# Patient Record
Sex: FEMALE | Race: BLACK OR AFRICAN AMERICAN | Employment: PART TIME | URBAN - METROPOLITAN AREA
[De-identification: names, ages, dates, MRNs, and addresses within clinical notes are randomized per-mention and may not be internally consistent; named-entity substitution may affect disease eponyms.]

---

## 2021-03-01 ENCOUNTER — OFFICE VISIT (OUTPATIENT)
Dept: PRIMARY CARE CLINIC | Age: 21
End: 2021-03-01
Payer: MEDICAID

## 2021-03-01 VITALS
WEIGHT: 206 LBS | BODY MASS INDEX: 37.91 KG/M2 | OXYGEN SATURATION: 99 % | RESPIRATION RATE: 16 BRPM | TEMPERATURE: 97.8 F | SYSTOLIC BLOOD PRESSURE: 98 MMHG | DIASTOLIC BLOOD PRESSURE: 58 MMHG | HEART RATE: 100 BPM | HEIGHT: 62 IN

## 2021-03-01 DIAGNOSIS — Z00.00 ANNUAL PHYSICAL EXAM: Primary | ICD-10-CM

## 2021-03-01 DIAGNOSIS — E55.9 VITAMIN D DEFICIENCY: ICD-10-CM

## 2021-03-01 DIAGNOSIS — F98.8 ATTENTION DEFICIT DISORDER, UNSPECIFIED HYPERACTIVITY PRESENCE: ICD-10-CM

## 2021-03-01 PROCEDURE — 99395 PREV VISIT EST AGE 18-39: CPT | Performed by: INTERNAL MEDICINE

## 2021-03-01 NOTE — PROGRESS NOTES
Chief Complaint   Patient presents with   [facility].    Physical     1. Have you been to the ER, urgent care clinic since your last visit? Hospitalized since your last visit? No    2. Have you seen or consulted any other health care providers outside of the [address] [place] [address] since your last visit? Include any pap smears or colon screening.  No  .3

## 2021-03-01 NOTE — PROGRESS NOTES
Sierra Bailey is a 24 y.o.  female and presents with     Chief Complaint   Patient presents with   Emilia Mendez Establish Care    Physical     Pt is here to establish care. Pt lives in an apartment . Pt has a . Pt is in an independent living program.  Pt sees someone for ADD and trazadone. Pt sees Karthikeyan Stisabel for her psych care. Patient's former  informed me that patient is here for a physical and to establish care. Patient is apparently working with weight management to help her lose weight. Patient denies any past medical history. Denies any medical history in the family. Obese    Past Medical History:   Diagnosis Date    Psychiatric disorder      History reviewed. No pertinent surgical history. Current Outpatient Medications   Medication Sig    dextroamphetamine-amphetamine (ADDERALL) 10 mg tablet Take 10 mg by mouth daily. Indications: ATTENTION-DEFICIT HYPERACTIVITY DISORDER    traZODone (DESYREL) 50 mg tablet Take 50 mg by mouth nightly.  fluticasone (FLOVENT DISKUS) 50 mcg/actuation inhaler Take 2 Puffs by inhalation daily.  clindamycin (CLINDAGEL) 1 % topical gel two (2) times a day. use thin film on affected area   Indications: ACNE VULGARIS    cetirizine (ZYRTEC) 10 mg tablet Take  by mouth daily.  benzoyl peroxide 5 % external liquid Apply  to affected area nightly. Indications: ACNE VULGARIS    ibuprofen (MOTRIN) 400 mg tablet Take 1 Tab by mouth every eight (8) hours as needed for Pain. No current facility-administered medications for this visit. Health Maintenance   Topic Date Due    Hepatitis C Screening  Never done    DTaP/Tdap/Td series (1 - Tdap) Never done    HPV Age 9Y-34Y (1 - 2-dose series) Never done    Flu Vaccine  Completed    Hepatitis A Peds Age 1-18  Aged Out    Pneumococcal 0-64 years  Aged [employer] History   Administered Date(s) Administered    Influenza Vaccine 11/01/2020     No LMP recorded.  (Menstrual status: Other (see Comment)). Allergies and Intolerances:   No Known Allergies    Family History:   History reviewed. No pertinent family history. Social History:   She  reports that she has never smoked. She has never used smokeless tobacco.  She  reports no history of alcohol use. Review of Systems:   General: negative for - chills, fatigue, fever, weight change  Psych: negative for - anxiety, depression, irritability or mood swings  ENT: negative for - headaches, hearing change, nasal congestion, oral lesions, sneezing or sore throat  Heme/ Lymph: negative for - bleeding problems, bruising, pallor or swollen lymph nodes  Endo: negative for - hot flashes, polydipsia/polyuria or temperature intolerance  Resp: negative for - cough, shortness of breath or wheezing  CV: negative for - chest pain, edema or palpitations  GI: negative for - abdominal pain, change in bowel habits, constipation, diarrhea or nausea/vomiting  : negative for - dysuria, hematuria, incontinence, pelvic pain or vulvar/vaginal symptoms  MSK: negative for - joint pain, joint swelling or muscle pain  Neuro: negative for - confusion, headaches, seizures or weakness  Derm: negative for - dry skin, hair changes, rash or skin lesion changes          Physical:   Vitals:   Vitals:    03/01/21 1312   BP: (!) 98/58   Pulse: 100   Resp: 16   Temp: 97.8 °F (36.6 °C)   TempSrc: Temporal   SpO2: 99%   Weight: 206 lb (93.4 kg)   Height: 5' 2\" (1.575 m)           Exam:   HEENT- atraumatic,normocephalic, awake, oriented, well nourished, obese  Neck - supple,no enlarged lymph nodes, no JVD, no thyromegaly  Chest- CTA, no rhonchi, no crackles  Heart- rrr, no murmurs / gallop/rub  Abdomen- soft,BS+,NT, no hepatosplenomegaly  Ext - no c/c/edema   Neuro- no focal deficits. Power 5/5 all extremities  Skin - warm,dry, no obvious rashes.           Review of Data:   LABS:   No results found for: WBC, HGB, HCT, PLT, HGBEXT, HCTEXT, PLTEXT, HGBEXT, HCTEXT, PLTEXT  No results found for: NA, K, CL, CO2, GLU, BUN, CREA  No results found for: CHOL, CHOLX, CHLST, CHOLV, HDL, HDLP, LDL, LDLC, DLDLP, TGLX, TRIGL, TRIGP  No components found for: GPT        Impression / Plan:        ICD-10-CM ICD-9-CM    1. Annual physical exam  Z00.00 V70.0 CBC WITH AUTOMATED DIFF      METABOLIC PANEL, COMPREHENSIVE      LIPID PANEL      TSH 3RD GENERATION      VITAMIN D, 25 HYDROXY      VITAMIN D, 25 HYDROXY      TSH 3RD GENERATION      LIPID PANEL      METABOLIC PANEL, COMPREHENSIVE      CBC WITH AUTOMATED DIFF   2. Attention deficit disorder, unspecified hyperactivity presence  F98.8 314.00    3. Vitamin D deficiency  E55.9 268.9 VITAMIN D, 25 HYDROXY      VITAMIN D, 25 HYDROXY     ADD/insomnia-being followed by psychiatry. Patient is a poor historian and did not give much history. Explained to patient risk benefits of the medications. Advised patient to stop meds if having any side effects. Pt verbalized understanding of the instructions. I have discussed the diagnosis with the patient and the intended plan as seen in the above orders. The patient has received an after-visit summary and questions were answered concerning future plans. I have discussed medication side effects and warnings with the patient as well. I have reviewed the plan of care with the patient, accepted their input and they are in agreement with the treatment goals. Reviewed plan of care. Patient has provided input and agrees with goals.         Yanely Jaimes MD

## 2021-03-02 LAB
25(OH)D3+25(OH)D2 SERPL-MCNC: 21.3 NG/ML (ref 30–100)
ALBUMIN SERPL-MCNC: 3.9 G/DL (ref 3.9–5)
ALBUMIN/GLOB SERPL: 1.4 {RATIO} (ref 1.2–2.2)
ALP SERPL-CCNC: 77 IU/L (ref 39–117)
ALT SERPL-CCNC: 13 IU/L (ref 0–32)
AST SERPL-CCNC: 16 IU/L (ref 0–40)
BASOPHILS # BLD AUTO: 0 X10E3/UL (ref 0–0.2)
BASOPHILS NFR BLD AUTO: 0 %
BILIRUB SERPL-MCNC: <0.2 MG/DL (ref 0–1.2)
BUN SERPL-MCNC: 14 MG/DL (ref 6–20)
BUN/CREAT SERPL: 15 (ref 9–23)
CALCIUM SERPL-MCNC: 9.2 MG/DL (ref 8.7–10.2)
CHLORIDE SERPL-SCNC: 109 MMOL/L (ref 96–106)
CHOLEST SERPL-MCNC: 125 MG/DL (ref 100–169)
CO2 SERPL-SCNC: 21 MMOL/L (ref 20–29)
CREAT SERPL-MCNC: 0.92 MG/DL (ref 0.57–1)
EOSINOPHIL # BLD AUTO: 0.1 X10E3/UL (ref 0–0.4)
EOSINOPHIL NFR BLD AUTO: 2 %
ERYTHROCYTE [DISTWIDTH] IN BLOOD BY AUTOMATED COUNT: 13.4 % (ref 11.7–15.4)
GLOBULIN SER CALC-MCNC: 2.7 G/DL (ref 1.5–4.5)
GLUCOSE SERPL-MCNC: 84 MG/DL (ref 65–99)
HCT VFR BLD AUTO: 37.7 % (ref 34–46.6)
HDLC SERPL-MCNC: 41 MG/DL
HGB BLD-MCNC: 12.3 G/DL (ref 11.1–15.9)
IMM GRANULOCYTES # BLD AUTO: 0 X10E3/UL (ref 0–0.1)
IMM GRANULOCYTES NFR BLD AUTO: 0 %
LDLC SERPL CALC-MCNC: 73 MG/DL (ref 0–109)
LYMPHOCYTES # BLD AUTO: 2.6 X10E3/UL (ref 0.7–3.1)
LYMPHOCYTES NFR BLD AUTO: 40 %
MCH RBC QN AUTO: 25.9 PG (ref 26.6–33)
MCHC RBC AUTO-ENTMCNC: 32.6 G/DL (ref 31.5–35.7)
MCV RBC AUTO: 80 FL (ref 79–97)
MONOCYTES # BLD AUTO: 0.4 X10E3/UL (ref 0.1–0.9)
MONOCYTES NFR BLD AUTO: 7 %
NEUTROPHILS # BLD AUTO: 3.4 X10E3/UL (ref 1.4–7)
NEUTROPHILS NFR BLD AUTO: 51 %
PLATELET # BLD AUTO: 206 X10E3/UL (ref 150–450)
POTASSIUM SERPL-SCNC: 3.7 MMOL/L (ref 3.5–5.2)
PROT SERPL-MCNC: 6.6 G/DL (ref 6–8.5)
RBC # BLD AUTO: 4.74 X10E6/UL (ref 3.77–5.28)
SODIUM SERPL-SCNC: 142 MMOL/L (ref 134–144)
TRIGL SERPL-MCNC: 45 MG/DL (ref 0–89)
TSH SERPL DL<=0.005 MIU/L-ACNC: 2.52 UIU/ML (ref 0.45–4.5)
VLDLC SERPL CALC-MCNC: 11 MG/DL (ref 5–40)
WBC # BLD AUTO: 6.6 X10E3/UL (ref 3.4–10.8)

## 2021-03-03 ENCOUNTER — TELEPHONE (OUTPATIENT)
Dept: PRIMARY CARE CLINIC | Age: 21
End: 2021-03-03

## 2021-03-03 DIAGNOSIS — E55.9 VITAMIN D DEFICIENCY: Primary | ICD-10-CM

## 2021-03-03 RX ORDER — ACETAMINOPHEN 500 MG
2000 TABLET ORAL DAILY
Qty: 90 CAP | Refills: 1 | Status: SHIPPED | OUTPATIENT
Start: 2021-03-03 | End: 2021-08-20 | Stop reason: SDUPTHER

## 2021-03-03 NOTE — TELEPHONE ENCOUNTER
Called and spoke with , informed that Dr Mandi Bynum sent vitamin d to the pharmacy and the rest of the labs were normal.

## 2021-11-18 DIAGNOSIS — E55.9 VITAMIN D DEFICIENCY: ICD-10-CM

## 2021-11-20 RX ORDER — ACETAMINOPHEN 500 MG
2000 TABLET ORAL DAILY
Qty: 90 CAPSULE | Refills: 0 | Status: SHIPPED | OUTPATIENT
Start: 2021-11-20

## 2023-01-30 ENCOUNTER — HOSPITAL ENCOUNTER (EMERGENCY)
Age: 23
Discharge: HOME | End: 2023-01-30
Attending: FAMILY MEDICINE
Payer: MEDICAID

## 2023-01-30 ENCOUNTER — APPOINTMENT (OUTPATIENT)
Dept: GENERAL RADIOLOGY | Age: 23
End: 2023-01-30
Attending: FAMILY MEDICINE
Payer: MEDICAID

## 2023-01-30 VITALS
WEIGHT: 178 LBS | RESPIRATION RATE: 18 BRPM | HEART RATE: 71 BPM | TEMPERATURE: 98.7 F | DIASTOLIC BLOOD PRESSURE: 65 MMHG | BODY MASS INDEX: 32.76 KG/M2 | OXYGEN SATURATION: 100 % | SYSTOLIC BLOOD PRESSURE: 113 MMHG | HEIGHT: 62 IN

## 2023-01-30 DIAGNOSIS — S69.91XA INJURY OF FINGER OF RIGHT HAND, INITIAL ENCOUNTER: Primary | ICD-10-CM

## 2023-01-30 PROCEDURE — 99282 EMERGENCY DEPT VISIT SF MDM: CPT

## 2023-01-30 NOTE — ED TRIAGE NOTES
Pt had fake nails and when she took them off 3 days ago and is having pain in her middle nail on the right hand.

## 2023-01-30 NOTE — Clinical Note
[facility] EMERGENCY DEPARTMENT  [address] [facility] [address]  [phone]    Work/School Note    Date: 1/30/2023    To Whom It May concern:      Gabriel Hewitt was seen and treated today in the emergency room by the following provider(s):  Attending Provider: Romana Just, DO. Gabriel Hewitt is excused from work/school on 01/30/23. She is clear to return to work/school on 01/31/23.         Sincerely,          Nitin Welch DO

## 2023-01-30 NOTE — DISCHARGE INSTRUCTIONS
Thank you! Thank you for allowing me to care for you in the emergency department. It is my goal to provide you with excellent care. If you have not received excellent quality care, please ask to speak to the nurse manager. Please fill out the survey that will come to you by mail or email since we listen to your feedback! Below you will find a list of your tests from today's visit. Should you have any questions, please do not hesitate to call the emergency department. Labs  No results found for this or any previous visit (from the past 12 hour(s)). Radiologic Studies  XR HAND RT MIN 3 V    (Results Pending)     CT Results  (Last 48 hours)      None          CXR Results  (Last 48 hours)      None          ------------------------------------------------------------------------------------------------------------  The exam and treatment you received in the Emergency Department were for an urgent problem and are not intended as complete care. It is important that you follow-up with a doctor, nurse practitioner, or physician assistant to:  (1) confirm your diagnosis,  (2) re-evaluation of changes in your illness and treatment, and  (3) for ongoing care. Please take your discharge instructions with you when you go to your follow-up appointment. If you have any problem arranging a follow-up appointment, contact the Emergency Department. If your symptoms become worse or you do not improve as expected and you are unable to reach your health care provider, please return to the Emergency Department. We are available 24 hours a day. If a prescription has been provided, please have it filled as soon as possible to prevent a delay in treatment. If you have any questions or reservations about taking the medication due to side effects or interactions with other medications, please call your primary care provider or contact the ER.

## 2023-01-30 NOTE — ED PROVIDER NOTES
EMERGENCY DEPARTMENT HISTORY AND PHYSICAL EXAM      Date: 1/30/2023  Patient Name: Isabel Hassan    History of Presenting Illness     Chief Complaint   Patient presents with    Finger Pain       History Provided By:     HPI: Isabel Hassan, is a very pleasant 25 y.o. female presenting to the ED with a chief complaint of finger pain. Patient states she slammed her right middle finger in a door. She took off her fake nail and has been having pain of the right distal middle finger since. No numbness tingling or weakness in this digit. No other injuries. No alleviating or aggravating factors. The patient denies any other symptoms at this time. PCP: Richa Gallegos MD    No current facility-administered medications on file prior to encounter. Current Outpatient Medications on File Prior to Encounter   Medication Sig Dispense Refill    cholecalciferol (VITAMIN D3) (2,000 UNITS /50 MCG) cap capsule Take 1 Capsule by mouth daily. (Patient not taking: Reported on 1/30/2023) 90 Capsule 0    dextroamphetamine-amphetamine (ADDERALL) 10 mg tablet Take 10 mg by mouth daily. Indications: ATTENTION-DEFICIT HYPERACTIVITY DISORDER (Patient not taking: Reported on 1/30/2023)      traZODone (DESYREL) 50 mg tablet Take 50 mg by mouth nightly. (Patient not taking: Reported on 1/30/2023)      fluticasone (FLOVENT DISKUS) 50 mcg/actuation inhaler Take 2 Puffs by inhalation daily. (Patient not taking: Reported on 1/30/2023)      clindamycin (CLINDAGEL) 1 % topical gel two (2) times a day. use thin film on affected area   Indications: ACNE VULGARIS (Patient not taking: Reported on 1/30/2023)      cetirizine (ZYRTEC) 10 mg tablet Take  by mouth daily. (Patient not taking: Reported on 1/30/2023)      benzoyl peroxide 5 % external liquid Apply  to affected area nightly.  Indications: ACNE VULGARIS (Patient not taking: Reported on 1/30/2023)      ibuprofen (MOTRIN) 400 mg tablet Take 1 Tab by mouth every eight (8) hours as needed for Pain. (Patient not taking: Reported on 1/30/2023) 20 Tab 0       Past History     Past Medical History:  Past Medical History:   Diagnosis Date    Psychiatric disorder        Past Surgical History:  History reviewed. No pertinent surgical history. Family History:  History reviewed. No pertinent family history. Social History:  Social History     Tobacco Use    Smoking status: Never    Smokeless tobacco: Never   Substance Use Topics    Alcohol use: No    Drug use: No       Allergies:  No Known Allergies      Review of Systems     Review of Systems   Constitutional:  Negative for activity change, appetite change, chills, fatigue and fever. HENT:  Negative for congestion and sore throat. Eyes:  Negative for photophobia and visual disturbance. Respiratory:  Negative for cough, shortness of breath and wheezing. Cardiovascular:  Negative for chest pain, palpitations and leg swelling. Gastrointestinal:  Negative for abdominal pain, diarrhea, nausea and vomiting. Endocrine: Negative for cold intolerance and heat intolerance. Musculoskeletal:  Negative for gait problem and joint swelling. Skin:  Negative for color change and rash. Neurological:  Negative for dizziness and headaches. Physical Exam     Physical Exam  Constitutional:       Appearance: She is well-developed. HENT:      Head: Normocephalic and atraumatic. Mouth/Throat:      Mouth: Mucous membranes are moist.      Pharynx: Oropharynx is clear. Eyes:      Conjunctiva/sclera: Conjunctivae normal.      Pupils: Pupils are equal, round, and reactive to light. Cardiovascular:      Rate and Rhythm: Normal rate and regular rhythm. Heart sounds: No murmur heard. Pulmonary:      Effort: No respiratory distress. Breath sounds: No stridor. No wheezing, rhonchi or rales. Abdominal:      General: There is no distension. Tenderness: There is no abdominal tenderness. There is no rebound.    Musculoskeletal: Cervical back: Normal range of motion and neck supple. Comments: Tenderness to palpation over the right distal phalanx of third digit. Skin:     General: Skin is warm and dry. Neurological:      General: No focal deficit present. Mental Status: She is alert and oriented to person, place, and time. Psychiatric:         Mood and Affect: Mood normal.         Behavior: Behavior normal.       Lab and Diagnostic Study Results     Labs -   No results found for this or any previous visit (from the past 12 hour(s)). Radiologic Studies -   @lastxrresult@  CT Results  (Last 48 hours)      None          CXR Results  (Last 48 hours)      None              Medical Decision Making   - I am the first provider for this patient. - I reviewed the vital signs, available nursing notes, past medical history, past surgical history, family history and social history. - Initial assessment performed. The patients presenting problems have been discussed, and they are in agreement with the care plan formulated and outlined with them. I have encouraged them to ask questions as they arise throughout their visit. Vital Signs-Reviewed the patient's vital signs. Patient Vitals for the past 12 hrs:   Temp Pulse Resp BP SpO2   01/30/23 1323 98.7 °F (37.1 °C) -- -- -- --   01/30/23 1321 98.7 °F (37.1 °C) 71 18 113/65 100 %                ED Course/ Provider Notes (Medical Decision Making):     Patient presented to the emergency department with the aforementioned chief complaint. On examination the patient is nontoxic. Vitals were reviewed per above. Discussed x-ray with patient however she is pregnant. She declines x-ray at this time. Will place patient in aluminum finger immobilizer and have her follow-up with orthopedics. Marlene Carlin was given a thorough list of signs and symptoms that would warrant an immediate return to the emergency department. Otherwise Marlene Carlin will follow up with PCP and orthopedics. Procedures   Medical Decision Makingedical Decision Making  Performed by: Che Moe DO  Procedures  None       Disposition   Disposition:     Home     All of the diagnostic tests were reviewed and questions answered. Diagnosis, care plan and treatment options were discussed. The patient understands the instructions and will follow up as directed. The patients results have been reviewed with them. They have been counseled regarding their diagnosis. The patient verbally convey understanding and agreement of the signs, symptoms, diagnosis, treatment and prognosis and additionally agrees to follow up as recommended with their PCP in 24 - 48 hours. They also agree with the care-plan and convey that all of their questions have been answered. I have also put together some discharge instructions for them that include: 1) educational information regarding their diagnosis, 2) how to care for their diagnosis at home, as well a 3) list of reasons why they would want to return to the ED prior to their follow-up appointment, should their condition change. DISCHARGE PLAN:    1. Current Discharge Medication List        CONTINUE these medications which have NOT CHANGED    Details   cholecalciferol (VITAMIN D3) (2,000 UNITS /50 MCG) cap capsule Take 1 Capsule by mouth daily. Qty: 90 Capsule, Refills: 0    Associated Diagnoses: Vitamin D deficiency      dextroamphetamine-amphetamine (ADDERALL) 10 mg tablet Take 10 mg by mouth daily. Indications: ATTENTION-DEFICIT HYPERACTIVITY DISORDER      traZODone (DESYREL) 50 mg tablet Take 50 mg by mouth nightly. fluticasone (FLOVENT DISKUS) 50 mcg/actuation inhaler Take 2 Puffs by inhalation daily. clindamycin (CLINDAGEL) 1 % topical gel two (2) times a day. use thin film on affected area   Indications: ACNE VULGARIS      cetirizine (ZYRTEC) 10 mg tablet Take  by mouth daily. benzoyl peroxide 5 % external liquid Apply  to affected area nightly. Indications: ACNE VULGARIS      ibuprofen (MOTRIN) 400 mg tablet Take 1 Tab by mouth every eight (8) hours as needed for Pain. Qty: 20 Tab, Refills: 0               2.   Follow-up Information       Follow up With Specialties Details Why Contact Info    [facility]  Schedule an appointment as soon as possible for a visit   [address] [facility]  [address]  [phone]              3.  Return to ED if worse       4. Current Discharge Medication List            Diagnosis     Clinical Impression:    1. Injury of finger of right hand, initial encounter        Attestations:    Karma Martin, DO    Please note that this dictation was completed with [employer], the computer voice recognition software. Quite often unanticipated grammatical, syntax, homophones, and other interpretive errors are inadvertently transcribed by the computer software. Please disregard these errors. Please excuse any errors that have escaped final proofreading. Thank you.

## 2023-05-05 ENCOUNTER — APPOINTMENT (OUTPATIENT)
Dept: CT IMAGING | Age: 23
End: 2023-05-05
Attending: NURSE PRACTITIONER
Payer: MEDICAID

## 2023-05-05 ENCOUNTER — HOSPITAL ENCOUNTER (EMERGENCY)
Age: 23
Discharge: HOME | End: 2023-05-05
Attending: EMERGENCY MEDICINE
Payer: MEDICAID

## 2023-05-05 VITALS
DIASTOLIC BLOOD PRESSURE: 62 MMHG | RESPIRATION RATE: 16 BRPM | OXYGEN SATURATION: 99 % | SYSTOLIC BLOOD PRESSURE: 120 MMHG | HEART RATE: 88 BPM | TEMPERATURE: 97.8 F

## 2023-05-05 DIAGNOSIS — N93.9 VAGINAL BLEEDING: Primary | ICD-10-CM

## 2023-05-05 DIAGNOSIS — N30.00 ACUTE CYSTITIS WITHOUT HEMATURIA: ICD-10-CM

## 2023-05-05 LAB
ABO + RH BLD: NORMAL
ALBUMIN SERPL-MCNC: 3.5 G/DL (ref 3.5–5)
ALBUMIN/GLOB SERPL: 0.9 (ref 1.1–2.2)
ALP SERPL-CCNC: 61 U/L (ref 45–117)
ALT SERPL-CCNC: 25 U/L (ref 12–78)
ANION GAP SERPL CALC-SCNC: 2 MMOL/L (ref 5–15)
APPEARANCE UR: ABNORMAL
AST SERPL-CCNC: 16 U/L (ref 15–37)
BACTERIA URNS QL MICRO: ABNORMAL /HPF
BASOPHILS # BLD: 0 K/UL (ref 0–0.1)
BASOPHILS NFR BLD: 0 % (ref 0–1)
BILIRUB SERPL-MCNC: 0.3 MG/DL (ref 0.2–1)
BILIRUB UR QL: NEGATIVE
BLOOD GROUP ANTIBODIES SERPL: NORMAL
BUN SERPL-MCNC: 14 MG/DL (ref 6–20)
BUN/CREAT SERPL: 17 (ref 12–20)
CALCIUM SERPL-MCNC: 9.1 MG/DL (ref 8.5–10.1)
CHLORIDE SERPL-SCNC: 108 MMOL/L (ref 97–108)
CO2 SERPL-SCNC: 27 MMOL/L (ref 21–32)
COLOR UR: ABNORMAL
CREAT SERPL-MCNC: 0.83 MG/DL (ref 0.55–1.02)
DIFFERENTIAL METHOD BLD: ABNORMAL
EOSINOPHIL # BLD: 0.1 K/UL (ref 0–0.4)
EOSINOPHIL NFR BLD: 2 % (ref 0–7)
EPITH CASTS URNS QL MICRO: ABNORMAL /LPF
ERYTHROCYTE [DISTWIDTH] IN BLOOD BY AUTOMATED COUNT: 13.8 % (ref 11.5–14.5)
GLOBULIN SER CALC-MCNC: 3.7 G/DL (ref 2–4)
GLUCOSE SERPL-MCNC: 133 MG/DL (ref 65–100)
GLUCOSE UR STRIP.AUTO-MCNC: NEGATIVE MG/DL
HCG UR QL: NEGATIVE
HCT VFR BLD AUTO: 40.1 % (ref 35–47)
HGB BLD-MCNC: 12.1 G/DL (ref 11.5–16)
HGB UR QL STRIP: NEGATIVE
IMM GRANULOCYTES # BLD AUTO: 0 K/UL (ref 0–0.04)
IMM GRANULOCYTES NFR BLD AUTO: 0 % (ref 0–0.5)
KETONES UR QL STRIP.AUTO: NEGATIVE MG/DL
LEUKOCYTE ESTERASE UR QL STRIP.AUTO: ABNORMAL
LYMPHOCYTES # BLD: 1.6 K/UL (ref 0.8–3.5)
LYMPHOCYTES NFR BLD: 28 % (ref 12–49)
MCH RBC QN AUTO: 24.5 PG (ref 26–34)
MCHC RBC AUTO-ENTMCNC: 30.2 G/DL (ref 30–36.5)
MCV RBC AUTO: 81.2 FL (ref 80–99)
MONOCYTES # BLD: 0.3 K/UL (ref 0–1)
MONOCYTES NFR BLD: 6 % (ref 5–13)
NEUTS SEG # BLD: 3.6 K/UL (ref 1.8–8)
NEUTS SEG NFR BLD: 64 % (ref 32–75)
NITRITE UR QL STRIP.AUTO: NEGATIVE
NRBC # BLD: 0 K/UL (ref 0–0.01)
NRBC BLD-RTO: 0 PER 100 WBC
PH UR STRIP: 7 (ref 5–8)
PLATELET # BLD AUTO: 231 K/UL (ref 150–400)
PMV BLD AUTO: 12.1 FL (ref 8.9–12.9)
POTASSIUM SERPL-SCNC: 3.6 MMOL/L (ref 3.5–5.1)
PROT SERPL-MCNC: 7.2 G/DL (ref 6.4–8.2)
PROT UR STRIP-MCNC: NEGATIVE MG/DL
RBC # BLD AUTO: 4.94 M/UL (ref 3.8–5.2)
RBC #/AREA URNS HPF: ABNORMAL /HPF (ref 0–5)
SODIUM SERPL-SCNC: 137 MMOL/L (ref 136–145)
SP GR UR REFRACTOMETRY: 1.03 (ref 1–1.03)
SPECIMEN EXP DATE BLD: NORMAL
UA: UC IF INDICATED,UAUC: ABNORMAL
UROBILINOGEN UR QL STRIP.AUTO: 1 EU/DL (ref 0.2–1)
WBC # BLD AUTO: 5.7 K/UL (ref 3.6–11)
WBC URNS QL MICRO: ABNORMAL /HPF (ref 0–4)

## 2023-05-05 PROCEDURE — 81001 URINALYSIS AUTO W/SCOPE: CPT

## 2023-05-05 PROCEDURE — 36415 COLL VENOUS BLD VENIPUNCTURE: CPT

## 2023-05-05 PROCEDURE — 74011250637 HC RX REV CODE- 250/637: Performed by: NURSE PRACTITIONER

## 2023-05-05 PROCEDURE — 85025 COMPLETE CBC W/AUTO DIFF WBC: CPT

## 2023-05-05 PROCEDURE — 96375 TX/PRO/DX INJ NEW DRUG ADDON: CPT

## 2023-05-05 PROCEDURE — 74011250636 HC RX REV CODE- 250/636: Performed by: NURSE PRACTITIONER

## 2023-05-05 PROCEDURE — 87086 URINE CULTURE/COLONY COUNT: CPT

## 2023-05-05 PROCEDURE — 74178 CT ABD&PLV WO CNTR FLWD CNTR: CPT

## 2023-05-05 PROCEDURE — 96374 THER/PROPH/DIAG INJ IV PUSH: CPT

## 2023-05-05 PROCEDURE — 74011000636 HC RX REV CODE- 636: Performed by: RADIOLOGY

## 2023-05-05 PROCEDURE — 99285 EMERGENCY DEPT VISIT HI MDM: CPT

## 2023-05-05 PROCEDURE — 80053 COMPREHEN METABOLIC PANEL: CPT

## 2023-05-05 PROCEDURE — 81025 URINE PREGNANCY TEST: CPT

## 2023-05-05 PROCEDURE — 86900 BLOOD TYPING SEROLOGIC ABO: CPT

## 2023-05-05 RX ORDER — CEPHALEXIN 500 MG/1
500 CAPSULE ORAL
Status: COMPLETED | OUTPATIENT
Start: 2023-05-05 | End: 2023-05-05

## 2023-05-05 RX ORDER — KETOROLAC TROMETHAMINE 30 MG/ML
15 INJECTION, SOLUTION INTRAMUSCULAR; INTRAVENOUS
Status: COMPLETED | OUTPATIENT
Start: 2023-05-05 | End: 2023-05-05

## 2023-05-05 RX ORDER — CEPHALEXIN 500 MG/1
500 CAPSULE ORAL 2 TIMES DAILY
Qty: 14 CAPSULE | Refills: 0 | Status: SHIPPED | OUTPATIENT
Start: 2023-05-05 | End: 2023-05-12

## 2023-05-05 RX ORDER — ONDANSETRON 2 MG/ML
4 INJECTION INTRAMUSCULAR; INTRAVENOUS
Status: COMPLETED | OUTPATIENT
Start: 2023-05-05 | End: 2023-05-05

## 2023-05-05 RX ADMIN — IOPAMIDOL 100 ML: 612 INJECTION, SOLUTION INTRAVENOUS at 10:59

## 2023-05-05 RX ADMIN — CEPHALEXIN 500 MG: 500 CAPSULE ORAL at 12:22

## 2023-05-05 RX ADMIN — ONDANSETRON 4 MG: 2 INJECTION INTRAMUSCULAR; INTRAVENOUS at 09:17

## 2023-05-05 RX ADMIN — KETOROLAC TROMETHAMINE 15 MG: 30 INJECTION, SOLUTION INTRAMUSCULAR; INTRAVENOUS at 10:22

## 2023-05-06 LAB
BACTERIA SPEC CULT: ABNORMAL
CC UR VC: ABNORMAL
SERVICE CMNT-IMP: ABNORMAL

## 2023-08-07 ENCOUNTER — HOSPITAL ENCOUNTER (EMERGENCY)
Facility: HOSPITAL | Age: 23
Discharge: HOME | End: 2023-08-07
Attending: EMERGENCY MEDICINE
Payer: MEDICAID

## 2023-08-07 VITALS
OXYGEN SATURATION: 99 % | DIASTOLIC BLOOD PRESSURE: 67 MMHG | RESPIRATION RATE: 18 BRPM | TEMPERATURE: 97.6 F | SYSTOLIC BLOOD PRESSURE: 111 MMHG | WEIGHT: 172 LBS | HEIGHT: 62 IN | HEART RATE: 77 BPM | BODY MASS INDEX: 31.65 KG/M2

## 2023-08-07 DIAGNOSIS — J06.9 ACUTE UPPER RESPIRATORY INFECTION: Primary | ICD-10-CM

## 2023-08-07 LAB
DEPRECATED S PYO AG THROAT QL EIA: NEGATIVE
FLUAV RNA SPEC QL NAA+PROBE: NOT DETECTED
FLUBV RNA SPEC QL NAA+PROBE: NOT DETECTED
HCG UR QL: NEGATIVE
SARS-COV-2 RNA RESP QL NAA+PROBE: NOT DETECTED

## 2023-08-07 PROCEDURE — 87070 CULTURE OTHR SPECIMN AEROBIC: CPT

## 2023-08-07 PROCEDURE — 87880 STREP A ASSAY W/OPTIC: CPT

## 2023-08-07 PROCEDURE — 99283 EMERGENCY DEPT VISIT LOW MDM: CPT

## 2023-08-07 PROCEDURE — 6370000000 HC RX 637 (ALT 250 FOR IP): Performed by: EMERGENCY MEDICINE

## 2023-08-07 PROCEDURE — 6360000002 HC RX W HCPCS: Performed by: EMERGENCY MEDICINE

## 2023-08-07 PROCEDURE — 81025 URINE PREGNANCY TEST: CPT

## 2023-08-07 PROCEDURE — 87636 SARSCOV2 & INF A&B AMP PRB: CPT

## 2023-08-07 RX ORDER — DIPHENHYDRAMINE HCL 25 MG
25 TABLET ORAL
Status: COMPLETED | OUTPATIENT
Start: 2023-08-07 | End: 2023-08-07

## 2023-08-07 RX ORDER — FEXOFENADINE HCL 180 MG/1
180 TABLET ORAL DAILY
Qty: 30 TABLET | Refills: 0 | Status: SHIPPED | OUTPATIENT
Start: 2023-08-07 | End: 2023-09-06

## 2023-08-07 RX ORDER — IBUPROFEN 400 MG/1
400 TABLET ORAL
Status: COMPLETED | OUTPATIENT
Start: 2023-08-07 | End: 2023-08-07

## 2023-08-07 RX ORDER — IBUPROFEN 400 MG/1
400 TABLET ORAL EVERY 6 HOURS PRN
Qty: 120 TABLET | Refills: 0 | Status: SHIPPED | OUTPATIENT
Start: 2023-08-07

## 2023-08-07 RX ORDER — ACETAMINOPHEN 325 MG/1
650 TABLET ORAL
Status: COMPLETED | OUTPATIENT
Start: 2023-08-07 | End: 2023-08-07

## 2023-08-07 RX ORDER — ACETAMINOPHEN 500 MG
500 TABLET ORAL 4 TIMES DAILY PRN
Qty: 120 TABLET | Refills: 0 | Status: SHIPPED | OUTPATIENT
Start: 2023-08-07

## 2023-08-07 RX ORDER — DEXAMETHASONE SODIUM PHOSPHATE 10 MG/ML
12 INJECTION, SOLUTION INTRAMUSCULAR; INTRAVENOUS ONCE
Status: COMPLETED | OUTPATIENT
Start: 2023-08-07 | End: 2023-08-07

## 2023-08-07 RX ORDER — FLUTICASONE PROPIONATE 50 MCG
1 SPRAY, SUSPENSION (ML) NASAL DAILY
Qty: 32 G | Refills: 1 | Status: SHIPPED | OUTPATIENT
Start: 2023-08-07

## 2023-08-07 RX ADMIN — DIPHENHYDRAMINE HYDROCHLORIDE 25 MG: 25 TABLET ORAL at 17:17

## 2023-08-07 RX ADMIN — IBUPROFEN 400 MG: 400 TABLET ORAL at 17:17

## 2023-08-07 RX ADMIN — ACETAMINOPHEN 650 MG: 325 TABLET, FILM COATED ORAL at 17:17

## 2023-08-07 RX ADMIN — DEXAMETHASONE SODIUM PHOSPHATE 12 MG: 10 INJECTION, SOLUTION INTRAMUSCULAR; INTRAVENOUS at 17:18

## 2023-08-07 NOTE — ED TRIAGE NOTES
Patient reports \" I want to take a covid test I want to take a pregnancy test and I want you to check my throat\"

## 2023-08-07 NOTE — ED PROVIDER NOTES
your doctor about these medications      amphetamine-dextroamphetamine 10 MG tablet  Commonly known as: ADDERALL     benzoyl peroxide 5 % external liquid     cetirizine 10 MG tablet  Commonly known as: ZYRTEC     clindamycin 1 % gel  Commonly known as: CLINDAGEL     Fluticasone Propionate (Inhal) 50 MCG/ACT Aepb     traZODone 50 MG tablet  Commonly known as: DESYREL     vitamin D 50 MCG (2000 UT) Caps capsule  Commonly known as: CHOLECALCIFEROL               Where to Get Your Medications        These medications were sent to [address] [place] [address] [phone] [address], [address]      Phone: [phone]   acetaminophen 500 MG tablet  fexofenadine 180 MG tablet  fluticasone 50 MCG/ACT nasal spray  ibuprofen 400 MG tablet           DISCONTINUED MEDICATIONS:  Discharge Medication List as of 8/7/2023  5:20 PM          I am the Primary Clinician of Record. Neto Liang MD (electronically signed)    (Please note that parts of this dictation were completed with voice recognition software. Quite often unanticipated grammatical, syntax, homophones, and other interpretive errors are inadvertently transcribed by the computer software. Please disregards these errors.  Please excuse any errors that have escaped final proofreading.)      Kimberly Alfaro MD  08/07/23 1799

## 2023-08-08 LAB
BACTERIA SPEC CULT: NORMAL
Lab: NORMAL

## 2023-09-13 ENCOUNTER — HOSPITAL ENCOUNTER (EMERGENCY)
Facility: HOSPITAL | Age: 23
Discharge: HOME | End: 2023-09-13
Attending: STUDENT IN AN ORGANIZED HEALTH CARE EDUCATION/TRAINING PROGRAM
Payer: MEDICAID

## 2023-09-13 ENCOUNTER — APPOINTMENT (OUTPATIENT)
Facility: HOSPITAL | Age: 23
End: 2023-09-13
Payer: MEDICAID

## 2023-09-13 VITALS
TEMPERATURE: 98.8 F | OXYGEN SATURATION: 99 % | RESPIRATION RATE: 18 BRPM | HEIGHT: 62 IN | SYSTOLIC BLOOD PRESSURE: 130 MMHG | HEART RATE: 86 BPM | BODY MASS INDEX: 31.83 KG/M2 | DIASTOLIC BLOOD PRESSURE: 80 MMHG | WEIGHT: 173 LBS

## 2023-09-13 DIAGNOSIS — S63.641A SPRAIN OF METACARPOPHALANGEAL (MCP) JOINT OF RIGHT THUMB, INITIAL ENCOUNTER: Primary | ICD-10-CM

## 2023-09-13 PROCEDURE — 73140 X-RAY EXAM OF FINGER(S): CPT

## 2023-09-13 PROCEDURE — 99283 EMERGENCY DEPT VISIT LOW MDM: CPT

## 2023-09-13 PROCEDURE — 6370000000 HC RX 637 (ALT 250 FOR IP): Performed by: STUDENT IN AN ORGANIZED HEALTH CARE EDUCATION/TRAINING PROGRAM

## 2023-09-13 RX ORDER — ACETAMINOPHEN 500 MG
1000 TABLET ORAL ONCE
Status: COMPLETED | OUTPATIENT
Start: 2023-09-13 | End: 2023-09-13

## 2023-09-13 RX ADMIN — ACETAMINOPHEN 1000 MG: 500 TABLET ORAL at 19:43

## 2023-09-13 ASSESSMENT — LIFESTYLE VARIABLES
HOW MANY STANDARD DRINKS CONTAINING ALCOHOL DO YOU HAVE ON A TYPICAL DAY: PATIENT DOES NOT DRINK
HOW OFTEN DO YOU HAVE A DRINK CONTAINING ALCOHOL: NEVER

## 2023-09-13 ASSESSMENT — PAIN - FUNCTIONAL ASSESSMENT: PAIN_FUNCTIONAL_ASSESSMENT: 0-10

## 2023-09-13 NOTE — ED PROVIDER NOTES
list has 2 medication(s) that are the same as other medications prescribed for you. Read the directions carefully, and ask your doctor or other care provider to review them with you. ASK your doctor about these medications      amphetamine-dextroamphetamine 10 MG tablet  Commonly known as: ADDERALL     benzoyl peroxide 5 % external liquid     cetirizine 10 MG tablet  Commonly known as: ZYRTEC     clindamycin 1 % gel  Commonly known as: CLINDAGEL     fluticasone 50 MCG/ACT nasal spray  Commonly known as: FLONASE  1 spray by Each Nostril route daily     Fluticasone Propionate (Inhal) 50 MCG/ACT Aepb     ibuprofen 400 MG tablet  Commonly known as: IBU  Take 1 tablet by mouth every 6 hours as needed for Pain     traZODone 50 MG tablet  Commonly known as: DESYREL     vitamin D 50 MCG (2000 UT) Caps capsule  Commonly known as: CHOLECALCIFEROL               Where to Get Your Medications        These medications were sent to [facility]/pharmacy #[id number] [address] [phone] [address], [address]      Phone: [phone]   Tylenol 325 [address] [address] [facility]       5. Current Discharge Medication List          Procedures, Critical Care, & Clinical Tools   Performed by: Rosemary Das MD  Procedures     Not Applicable     Results, Consults, Medications     Consults:  None   Labs:  No results found for this or any previous visit (from the past 12 hour(s)). Radiologic Studies:  XR FINGER RIGHT (MIN 2 VIEWS)   Final Result   No acute abnormality. Medications ordered:  Medications   acetaminophen (TYLENOL) tablet 1,000 mg (1,000 mg Oral Given 9/13/23 1943)       Documentation Comments   - I am the first and primary provider for this patient and am the primary provider of record. - Initial assessment performed. The patients presenting problems have been discussed, and the staff are in agreement with the care plan formulated and outlined with them.   I

## 2023-09-13 NOTE — ED TRIAGE NOTES
Endorses roght thumb pain, limited rom. States thumb went backwards when hand slipped pushing a bed. States 2 months pregnant.

## 2023-09-14 NOTE — DISCHARGE INSTRUCTIONS
Thank you! Thank you for allowing me to care for you in the emergency department. I sincerely hope that you are satisfied with your visit today. It is my goal to provide you with excellent care. Below you will find a list of your labs and imaging from your visit today if applicable. Should you have any questions regarding these results please do not hesitate to call the emergency department. Please review [employer] for a more detailed result list since the below list may not be comprehensive. Instructions on how to sign up to [employer] should be provided in this packet. Labs -  No results found for this or any previous visit (from the past 12 hour(s)). Radiologic Studies -   XR FINGER RIGHT (MIN 2 VIEWS)   Final Result   No acute abnormality. If you feel that you have not received excellent quality care or timely care, please ask to speak to the nurse manager. Please choose us in the future for your continued health care needs. ------------------------------------------------------------------------------------------------------------  The exam and treatment you received in the Emergency Department were for an urgent problem and are not intended as complete care. It is important that you follow-up with a doctor, nurse practitioner, or physician assistant to:  (1) confirm your diagnosis,  (2) re-evaluation of changes in your illness and treatment, and  (3) for ongoing care. If your symptoms become worse or you do not improve as expected and you are unable to reach your usual health care provider, you should return to the Emergency Department. We are available 24 hours a day. Please take your discharge instructions with you when you go to your follow-up appointment. If a prescription has been provided, please have it filled as soon as possible to prevent a delay in treatment. Read the entire medication instruction sheet provided to you by the pharmacy.  If you have any questions or

## 2023-10-08 ENCOUNTER — HOSPITAL ENCOUNTER (EMERGENCY)
Facility: HOSPITAL | Age: 23
Discharge: HOME | End: 2023-10-08
Attending: STUDENT IN AN ORGANIZED HEALTH CARE EDUCATION/TRAINING PROGRAM
Payer: MEDICAID

## 2023-10-08 VITALS
SYSTOLIC BLOOD PRESSURE: 118 MMHG | DIASTOLIC BLOOD PRESSURE: 70 MMHG | HEIGHT: 62 IN | RESPIRATION RATE: 16 BRPM | OXYGEN SATURATION: 100 % | HEART RATE: 74 BPM | WEIGHT: 224.6 LBS | BODY MASS INDEX: 41.33 KG/M2 | TEMPERATURE: 98.9 F

## 2023-10-08 DIAGNOSIS — A59.9 TRICHOMONIASIS: ICD-10-CM

## 2023-10-08 DIAGNOSIS — N30.00 ACUTE CYSTITIS WITHOUT HEMATURIA: Primary | ICD-10-CM

## 2023-10-08 LAB
APPEARANCE UR: ABNORMAL
BACTERIA URNS QL MICRO: ABNORMAL /HPF
BILIRUB UR QL: NEGATIVE
COLOR UR: ABNORMAL
GLUCOSE UR STRIP.AUTO-MCNC: NEGATIVE MG/DL
HGB UR QL STRIP: ABNORMAL
KETONES UR QL STRIP.AUTO: 15 MG/DL
LEUKOCYTE ESTERASE UR QL STRIP.AUTO: ABNORMAL
NITRITE UR QL STRIP.AUTO: NEGATIVE
PH UR STRIP: 6.5 (ref 5–8)
PROT UR STRIP-MCNC: 30 MG/DL
RBC #/AREA URNS HPF: ABNORMAL /HPF (ref 0–3)
SP GR UR REFRACTOMETRY: >1.03 (ref 1–1.03)
TRICHOMONAS UR QL MICRO: PRESENT
URINE CULTURE IF INDICATED: ABNORMAL
UROBILINOGEN UR QL STRIP.AUTO: 1 EU/DL (ref 0.2–1)
WBC URNS QL MICRO: ABNORMAL /HPF (ref 0–5)

## 2023-10-08 PROCEDURE — 96372 THER/PROPH/DIAG INJ SC/IM: CPT

## 2023-10-08 PROCEDURE — 87147 CULTURE TYPE IMMUNOLOGIC: CPT

## 2023-10-08 PROCEDURE — 87086 URINE CULTURE/COLONY COUNT: CPT

## 2023-10-08 PROCEDURE — 99284 EMERGENCY DEPT VISIT MOD MDM: CPT

## 2023-10-08 PROCEDURE — 6370000000 HC RX 637 (ALT 250 FOR IP): Performed by: STUDENT IN AN ORGANIZED HEALTH CARE EDUCATION/TRAINING PROGRAM

## 2023-10-08 PROCEDURE — 2580000003 HC RX 258: Performed by: STUDENT IN AN ORGANIZED HEALTH CARE EDUCATION/TRAINING PROGRAM

## 2023-10-08 PROCEDURE — 6360000002 HC RX W HCPCS: Performed by: STUDENT IN AN ORGANIZED HEALTH CARE EDUCATION/TRAINING PROGRAM

## 2023-10-08 PROCEDURE — 81001 URINALYSIS AUTO W/SCOPE: CPT

## 2023-10-08 PROCEDURE — 87591 N.GONORRHOEAE DNA AMP PROB: CPT

## 2023-10-08 PROCEDURE — 87491 CHLMYD TRACH DNA AMP PROBE: CPT

## 2023-10-08 RX ORDER — SULFAMETHOXAZOLE AND TRIMETHOPRIM 800; 160 MG/1; MG/1
1 TABLET ORAL
Status: COMPLETED | OUTPATIENT
Start: 2023-10-08 | End: 2023-10-08

## 2023-10-08 RX ORDER — SULFAMETHOXAZOLE AND TRIMETHOPRIM 800; 160 MG/1; MG/1
1 TABLET ORAL 2 TIMES DAILY
Qty: 6 TABLET | Refills: 0 | Status: SHIPPED | OUTPATIENT
Start: 2023-10-08 | End: 2023-10-11

## 2023-10-08 RX ORDER — ACETAMINOPHEN 500 MG
1000 TABLET ORAL ONCE
Status: COMPLETED | OUTPATIENT
Start: 2023-10-08 | End: 2023-10-08

## 2023-10-08 RX ORDER — ONDANSETRON 4 MG/1
4 TABLET, ORALLY DISINTEGRATING ORAL ONCE
Status: DISCONTINUED | OUTPATIENT
Start: 2023-10-08 | End: 2023-10-08

## 2023-10-08 RX ORDER — FAMOTIDINE 20 MG/1
20 TABLET, FILM COATED ORAL ONCE
Status: COMPLETED | OUTPATIENT
Start: 2023-10-08 | End: 2023-10-08

## 2023-10-08 RX ORDER — IBUPROFEN 600 MG/1
600 TABLET ORAL EVERY 8 HOURS PRN
Qty: 20 TABLET | Refills: 0 | Status: SHIPPED | OUTPATIENT
Start: 2023-10-08

## 2023-10-08 RX ORDER — DOXYCYCLINE 100 MG/1
100 CAPSULE ORAL ONCE
Status: COMPLETED | OUTPATIENT
Start: 2023-10-08 | End: 2023-10-08

## 2023-10-08 RX ORDER — LIDOCAINE HYDROCHLORIDE 20 MG/ML
15 SOLUTION OROPHARYNGEAL
Status: DISCONTINUED | OUTPATIENT
Start: 2023-10-08 | End: 2023-10-08

## 2023-10-08 RX ORDER — METOCLOPRAMIDE 5 MG/1
10 TABLET ORAL ONCE
Status: COMPLETED | OUTPATIENT
Start: 2023-10-08 | End: 2023-10-08

## 2023-10-08 RX ORDER — METRONIDAZOLE 250 MG/1
2000 TABLET ORAL
Status: COMPLETED | OUTPATIENT
Start: 2023-10-08 | End: 2023-10-08

## 2023-10-08 RX ORDER — ONDANSETRON 4 MG/1
4 TABLET, FILM COATED ORAL EVERY 8 HOURS PRN
Qty: 20 TABLET | Refills: 0 | Status: SHIPPED | OUTPATIENT
Start: 2023-10-08

## 2023-10-08 RX ORDER — DOXYCYCLINE HYCLATE 100 MG
100 TABLET ORAL 2 TIMES DAILY
Qty: 14 TABLET | Refills: 0 | Status: SHIPPED | OUTPATIENT
Start: 2023-10-08 | End: 2023-10-15

## 2023-10-08 RX ORDER — MAGNESIUM HYDROXIDE/ALUMINUM HYDROXICE/SIMETHICONE 120; 1200; 1200 MG/30ML; MG/30ML; MG/30ML
30 SUSPENSION ORAL ONCE
Status: DISCONTINUED | OUTPATIENT
Start: 2023-10-08 | End: 2023-10-08

## 2023-10-08 RX ADMIN — DOXYCYCLINE 100 MG: 100 CAPSULE ORAL at 04:38

## 2023-10-08 RX ADMIN — ACETAMINOPHEN 1000 MG: 500 TABLET ORAL at 03:33

## 2023-10-08 RX ADMIN — METRONIDAZOLE 2000 MG: 250 TABLET ORAL at 04:37

## 2023-10-08 RX ADMIN — FAMOTIDINE 20 MG: 20 TABLET, FILM COATED ORAL at 03:33

## 2023-10-08 RX ADMIN — WATER 500 MG: 1 INJECTION INTRAMUSCULAR; INTRAVENOUS; SUBCUTANEOUS at 04:37

## 2023-10-08 RX ADMIN — SULFAMETHOXAZOLE AND TRIMETHOPRIM 1 TABLET: 800; 160 TABLET ORAL at 04:38

## 2023-10-08 RX ADMIN — METOCLOPRAMIDE 10 MG: 5 TABLET ORAL at 03:33

## 2023-10-08 ASSESSMENT — PAIN DESCRIPTION - ORIENTATION: ORIENTATION: MID;LEFT

## 2023-10-08 ASSESSMENT — PAIN - FUNCTIONAL ASSESSMENT
PAIN_FUNCTIONAL_ASSESSMENT: 0-10
PAIN_FUNCTIONAL_ASSESSMENT: ACTIVITIES ARE NOT PREVENTED
PAIN_FUNCTIONAL_ASSESSMENT: 0-10

## 2023-10-08 ASSESSMENT — PAIN SCALES - GENERAL
PAINLEVEL_OUTOF10: 8
PAINLEVEL_OUTOF10: 0

## 2023-10-08 ASSESSMENT — PAIN DESCRIPTION - LOCATION: LOCATION: ABDOMEN

## 2023-10-08 ASSESSMENT — PAIN DESCRIPTION - PAIN TYPE: TYPE: ACUTE PAIN

## 2023-10-08 NOTE — ED NOTES
Discharged home to self. Ambulatory out of ED. VS WDL.  0 s/s acute distress. Respirations even and unlabored. Discharge instructions and follow up care reviewed. Patient receptive and demonstrated knowledge of instruction via teach-back method.         Anand Bacon RN  10/08/23 0992
Patient appears disheveled and smells heavily of body odor.       Clearance ÁNGELA Hardwick  10/08/23 5366
Patient refused GI cocktail. Dr. Roberto Carlos Naranjo notified. States she has had before and does not like the taste.       Kaleyn Plascencia RN  10/08/23 2953
[facility]

## 2023-10-08 NOTE — ED TRIAGE NOTES
Patient reports headache and mid-abdominal pain x 3 days. + nausea. Pt's last bowel movement on yesterday. No reports of vomiting or diarrhea.

## 2023-10-09 LAB
BACTERIA SPEC CULT: ABNORMAL
C TRACH DNA SPEC QL NAA+PROBE: NEGATIVE
COLONY COUNT, CNT: ABNORMAL
Lab: ABNORMAL
N GONORRHOEA DNA SPEC QL NAA+PROBE: POSITIVE
SAMPLE TYPE: ABNORMAL
SERVICE CMNT-IMP: ABNORMAL
SPECIMEN SOURCE: ABNORMAL

## 2023-11-15 ENCOUNTER — HOSPITAL ENCOUNTER (EMERGENCY)
Facility: HOSPITAL | Age: 23
Discharge: HOME | End: 2023-11-15
Attending: STUDENT IN AN ORGANIZED HEALTH CARE EDUCATION/TRAINING PROGRAM
Payer: MEDICAID

## 2023-11-15 ENCOUNTER — APPOINTMENT (OUTPATIENT)
Facility: HOSPITAL | Age: 23
End: 2023-11-15
Payer: MEDICAID

## 2023-11-15 VITALS
BODY MASS INDEX: 40.73 KG/M2 | SYSTOLIC BLOOD PRESSURE: 122 MMHG | OXYGEN SATURATION: 100 % | RESPIRATION RATE: 16 BRPM | TEMPERATURE: 98.6 F | WEIGHT: 222.66 LBS | HEART RATE: 86 BPM | DIASTOLIC BLOOD PRESSURE: 81 MMHG

## 2023-11-15 DIAGNOSIS — R07.9 CHEST PAIN, UNSPECIFIED TYPE: Primary | ICD-10-CM

## 2023-11-15 LAB
ALBUMIN SERPL-MCNC: 3.6 G/DL (ref 3.5–5)
ALBUMIN/GLOB SERPL: 0.9 (ref 1.1–2.2)
ALP SERPL-CCNC: 53 U/L (ref 45–117)
ALT SERPL-CCNC: 18 U/L (ref 12–78)
ANION GAP SERPL CALC-SCNC: 4 MMOL/L (ref 5–15)
AST SERPL-CCNC: 17 U/L (ref 15–37)
BASOPHILS # BLD: 0 K/UL (ref 0–0.1)
BASOPHILS NFR BLD: 0 % (ref 0–1)
BILIRUB SERPL-MCNC: 0.6 MG/DL (ref 0.2–1)
BUN SERPL-MCNC: 10 MG/DL (ref 6–20)
BUN/CREAT SERPL: 12 (ref 12–20)
CALCIUM SERPL-MCNC: 8.8 MG/DL (ref 8.5–10.1)
CHLORIDE SERPL-SCNC: 108 MMOL/L (ref 97–108)
CO2 SERPL-SCNC: 26 MMOL/L (ref 21–32)
COMMENT:: NORMAL
CREAT SERPL-MCNC: 0.84 MG/DL (ref 0.55–1.02)
DIFFERENTIAL METHOD BLD: ABNORMAL
EKG ATRIAL RATE: 87 BPM
EKG DIAGNOSIS: NORMAL
EKG P AXIS: 26 DEGREES
EKG P-R INTERVAL: 146 MS
EKG Q-T INTERVAL: 368 MS
EKG QRS DURATION: 80 MS
EKG QTC CALCULATION (BAZETT): 442 MS
EKG R AXIS: 59 DEGREES
EKG T AXIS: 28 DEGREES
EKG VENTRICULAR RATE: 87 BPM
EOSINOPHIL # BLD: 0.1 K/UL (ref 0–0.4)
EOSINOPHIL NFR BLD: 1 % (ref 0–7)
ERYTHROCYTE [DISTWIDTH] IN BLOOD BY AUTOMATED COUNT: 13.5 % (ref 11.5–14.5)
GLOBULIN SER CALC-MCNC: 4.2 G/DL (ref 2–4)
GLUCOSE SERPL-MCNC: 78 MG/DL (ref 65–100)
HCT VFR BLD AUTO: 37.5 % (ref 35–47)
HGB BLD-MCNC: 11.8 G/DL (ref 11.5–16)
IMM GRANULOCYTES # BLD AUTO: 0 K/UL (ref 0–0.04)
IMM GRANULOCYTES NFR BLD AUTO: 0 % (ref 0–0.5)
LYMPHOCYTES # BLD: 1.7 K/UL (ref 0.8–3.5)
LYMPHOCYTES NFR BLD: 34 % (ref 12–49)
MCH RBC QN AUTO: 24.5 PG (ref 26–34)
MCHC RBC AUTO-ENTMCNC: 31.5 G/DL (ref 30–36.5)
MCV RBC AUTO: 78 FL (ref 80–99)
MONOCYTES # BLD: 0.5 K/UL (ref 0–1)
MONOCYTES NFR BLD: 10 % (ref 5–13)
NEUTS SEG # BLD: 2.8 K/UL (ref 1.8–8)
NEUTS SEG NFR BLD: 55 % (ref 32–75)
NRBC # BLD: 0 K/UL (ref 0–0.01)
NRBC BLD-RTO: 0 PER 100 WBC
PLATELET # BLD AUTO: 224 K/UL (ref 150–400)
PMV BLD AUTO: 11.2 FL (ref 8.9–12.9)
POTASSIUM SERPL-SCNC: 3.7 MMOL/L (ref 3.5–5.1)
PROT SERPL-MCNC: 7.8 G/DL (ref 6.4–8.2)
RBC # BLD AUTO: 4.81 M/UL (ref 3.8–5.2)
SODIUM SERPL-SCNC: 138 MMOL/L (ref 136–145)
SPECIMEN HOLD: NORMAL
TROPONIN I SERPL HS-MCNC: <4 NG/L (ref 0–51)
WBC # BLD AUTO: 5.1 K/UL (ref 3.6–11)

## 2023-11-15 PROCEDURE — 36415 COLL VENOUS BLD VENIPUNCTURE: CPT

## 2023-11-15 PROCEDURE — 99285 EMERGENCY DEPT VISIT HI MDM: CPT

## 2023-11-15 PROCEDURE — 84484 ASSAY OF TROPONIN QUANT: CPT

## 2023-11-15 PROCEDURE — 96374 THER/PROPH/DIAG INJ IV PUSH: CPT

## 2023-11-15 PROCEDURE — 85025 COMPLETE CBC W/AUTO DIFF WBC: CPT

## 2023-11-15 PROCEDURE — 6360000002 HC RX W HCPCS

## 2023-11-15 PROCEDURE — 93005 ELECTROCARDIOGRAM TRACING: CPT | Performed by: STUDENT IN AN ORGANIZED HEALTH CARE EDUCATION/TRAINING PROGRAM

## 2023-11-15 PROCEDURE — 80053 COMPREHEN METABOLIC PANEL: CPT

## 2023-11-15 PROCEDURE — 71046 X-RAY EXAM CHEST 2 VIEWS: CPT

## 2023-11-15 RX ORDER — KETOROLAC TROMETHAMINE 30 MG/ML
30 INJECTION, SOLUTION INTRAMUSCULAR; INTRAVENOUS
Status: COMPLETED | OUTPATIENT
Start: 2023-11-15 | End: 2023-11-15

## 2023-11-15 RX ADMIN — KETOROLAC TROMETHAMINE 30 MG: 30 INJECTION, SOLUTION INTRAMUSCULAR; INTRAVENOUS at 09:08

## 2023-11-15 ASSESSMENT — HEART SCORE: ECG: 0

## 2023-11-15 NOTE — ED TRIAGE NOTES
Pt c/o mid chest pain, denies radiating pain, pt has had problems with her chest her whole life because her mother was drinking and smoking , some sob , pt was in the bed all day yesterday, got worse a month ago, has been sick with cough and congestions, pain reproducible

## 2023-11-15 NOTE — ED PROVIDER NOTES
[address] EMERGENCY DEP  EMERGENCY DEPARTMENT ENCOUNTER      Pt Name: Osiel Kelly  MRN: [id number]  [address] [facility] [place] 2000  Date of evaluation: 11/15/2023  Provider: Alma Castillo PA-C    CHIEF COMPLAINT       Chief Complaint   Patient presents with    Chest Pain         HISTORY OF PRESENT ILLNESS   (Location/Symptom, Timing/Onset, Context/Setting, Quality, Duration, Modifying Factors, Severity)  Note limiting factors. 81-year-old female no significant past medical history presents with complaint of chest pain. Patient states the chest pain is located in the middle of her chest and does not radiate. She reports she has had chest pain her entire life secondary to her mom drinking and smoking during her pregnancy. Also endorses to some shortness of breath without any identifiable triggers. There is no change in chest pain with breathing. No recent surgery or history of long travel. Patient is not currently on any birth control medications. Reports that this chest pain worsened over the last month. Has been sick over the last week with cough and congestion. Denies fevers, headache, dizziness, abdominal pain, nausea, vomiting, changes in bowel or bladder. Review of External Medical Records:     Nursing Notes were reviewed. REVIEW OF SYSTEMS    (2-9 systems for level 4, 10 or more for level 5)     Review of Systems    Except as noted above the remainder of the review of systems was reviewed and negative. PAST MEDICAL HISTORY     Past Medical History:   Diagnosis Date    Psychiatric disorder          SURGICAL HISTORY     No past surgical history on file.       CURRENT MEDICATIONS       Previous Medications    ACETAMINOPHEN (TYLENOL) 325 MG CAPS    Take 650 mg by mouth every 6 hours as needed (for pain or fever)    ACETAMINOPHEN (TYLENOL) 325 MG CAPS    Take 650 mg by mouth every 6 hours as needed (for pain or fever)    ACETAMINOPHEN (TYLENOL) 500 MG TABLET    Take 1 tablet by mouth 4

## 2024-07-11 ENCOUNTER — HOSPITAL ENCOUNTER (EMERGENCY)
Facility: HOSPITAL | Age: 24
Discharge: HOME | End: 2024-07-11
Payer: MEDICAID

## 2024-07-11 VITALS
HEART RATE: 96 BPM | SYSTOLIC BLOOD PRESSURE: 121 MMHG | TEMPERATURE: 98 F | OXYGEN SATURATION: 98 % | WEIGHT: 242 LBS | DIASTOLIC BLOOD PRESSURE: 60 MMHG | HEIGHT: 62 IN | RESPIRATION RATE: 16 BRPM | BODY MASS INDEX: 44.53 KG/M2

## 2024-07-11 DIAGNOSIS — B37.31 CANDIDIASIS OF VAGINA: ICD-10-CM

## 2024-07-11 DIAGNOSIS — H66.002 NON-RECURRENT ACUTE SUPPURATIVE OTITIS MEDIA OF LEFT EAR WITHOUT SPONTANEOUS RUPTURE OF TYMPANIC MEMBRANE: ICD-10-CM

## 2024-07-11 DIAGNOSIS — J06.9 ACUTE UPPER RESPIRATORY INFECTION: ICD-10-CM

## 2024-07-11 DIAGNOSIS — K04.7 DENTAL INFECTION: Primary | ICD-10-CM

## 2024-07-11 DIAGNOSIS — N91.1 SECONDARY AMENORRHEA: ICD-10-CM

## 2024-07-11 LAB
CLUE CELLS VAG QL WET PREP: ABNORMAL
FLUAV RNA SPEC QL NAA+PROBE: NOT DETECTED
FLUBV RNA SPEC QL NAA+PROBE: NOT DETECTED
HCG UR QL: NEGATIVE
SARS-COV-2 RNA RESP QL NAA+PROBE: NOT DETECTED
T VAGINALIS VAG QL WET PREP: ABNORMAL
YEAST: ABNORMAL

## 2024-07-11 PROCEDURE — 81025 URINE PREGNANCY TEST: CPT

## 2024-07-11 PROCEDURE — 99283 EMERGENCY DEPT VISIT LOW MDM: CPT

## 2024-07-11 PROCEDURE — 87491 CHLMYD TRACH DNA AMP PROBE: CPT

## 2024-07-11 PROCEDURE — 87636 SARSCOV2 & INF A&B AMP PRB: CPT

## 2024-07-11 PROCEDURE — 87210 SMEAR WET MOUNT SALINE/INK: CPT

## 2024-07-11 PROCEDURE — 87591 N.GONORRHOEAE DNA AMP PROB: CPT

## 2024-07-11 RX ORDER — FLUCONAZOLE 150 MG/1
150 TABLET ORAL ONCE
Qty: 1 TABLET | Refills: 0 | Status: SHIPPED | OUTPATIENT
Start: 2024-07-11 | End: 2024-07-11

## 2024-07-11 RX ORDER — AMOXICILLIN AND CLAVULANATE POTASSIUM 875; 125 MG/1; MG/1
1 TABLET, FILM COATED ORAL 2 TIMES DAILY
Qty: 14 TABLET | Refills: 0 | Status: SHIPPED | OUTPATIENT
Start: 2024-07-11 | End: 2024-07-18

## 2024-07-11 RX ORDER — CETIRIZINE HYDROCHLORIDE 10 MG/1
10 TABLET ORAL DAILY
Qty: 30 TABLET | Refills: 0 | Status: SHIPPED | OUTPATIENT
Start: 2024-07-11

## 2024-07-11 RX ORDER — CETIRIZINE HYDROCHLORIDE 10 MG/1
10 TABLET ORAL DAILY
Qty: 30 TABLET | Refills: 0 | Status: SHIPPED | OUTPATIENT
Start: 2024-07-11 | End: 2024-07-11

## 2024-07-11 RX ORDER — AMOXICILLIN AND CLAVULANATE POTASSIUM 875; 125 MG/1; MG/1
1 TABLET, FILM COATED ORAL 2 TIMES DAILY
Qty: 14 TABLET | Refills: 0 | Status: SHIPPED | OUTPATIENT
Start: 2024-07-11 | End: 2024-07-11

## 2024-07-11 ASSESSMENT — PAIN DESCRIPTION - DESCRIPTORS: DESCRIPTORS: ACHING

## 2024-07-11 ASSESSMENT — PAIN SCALES - GENERAL: PAINLEVEL_OUTOF10: 6

## 2024-07-11 ASSESSMENT — PAIN DESCRIPTION - ORIENTATION: ORIENTATION: LOWER

## 2024-07-11 ASSESSMENT — PAIN DESCRIPTION - LOCATION: LOCATION: ABDOMEN

## 2024-07-11 ASSESSMENT — PAIN - FUNCTIONAL ASSESSMENT: PAIN_FUNCTIONAL_ASSESSMENT: 0-10

## 2024-07-11 NOTE — DISCHARGE INSTRUCTIONS
It was a pleasure taking care of you at [facility] Emergency Department today.  We know that when you come to [facility], you are entrusting us with your health, comfort, and safety.  Our physicians and nurses honor that trust, and we truly appreciate the opportunity to care for you and your loved ones.      We also value our feedback.  If you receive a survey about your Emergency Department experience today, please fill it out.  We care about our patients' feedback, and we listen to what you have to say.  Thank you!

## 2024-07-11 NOTE — ED TRIAGE NOTES
Pt reports there is a hole in her tooth to the upper left side, vomiting every time she eats, and wants to be checked for STDs

## 2024-07-11 NOTE — ED PROVIDER NOTES
[facility] EMERGENCY DEPT  EMERGENCY DEPARTMENT ENCOUNTER       Pt Name: Malka Payton  MRN: [id number]  Birthdate 2000  Date of evaluation: 7/11/2024  Provider: YURIDIA Damon NP   PCP: Gold Peña MD  Note Started: 5:45 PM EDT 7/11/24     CHIEF COMPLAINT       Chief Complaint   Patient presents with    Dental Pain    Vaginal Discharge    Emesis        HISTORY OF PRESENT ILLNESS: 1 or more elements      History From: Patient  HPI Limitations: None     Malka Payton is a 23 y.o. female who presents for multiple complaints.     Dental Pain-- She reports dental pain. Left upper gum. Denies facial swelling, fever, chills. She reports constant infections. She does not have a dentist.     Vaginal Discharge- Associated with lower abd pain. Denies vaginal odor or itching. She has not tried anything for her sx. Sexually active with 1 partner. No condoms. She has not had a menses in 4 years. Denies hx of irregular menses and has not been evaluated for it. She reports twins that are under the age of 1 years old.     Vomiting- Onset 1 week ago. Reports sx only occur with eating food but not with fluids. States she vomits after every meal. States she is eating pasta, sausages, beef and a lot of dairy, Denies hx of GERD. She has not taken anything for her sx     URI- She c/o cold sx that began 1 week ago after being informed that there was a potential COVID exposure at a meeting she was located. She reports wearing her mask. She has fluid in the ears. Also reports cough and nasal congestion. Denies fatigue, loss of taste, appetite, CP, SOB or wheezing. Reports hx of seasonal allergies      Nursing Notes were all reviewed and agreed with or any disagreements were addressed in the HPI.     REVIEW OF SYSTEMS      Review of Systems     Positives and Pertinent negatives as per HPI.    PAST HISTORY     Past Medical History:  Past Medical History:   Diagnosis Date    Psychiatric disorder        Past Surgical History:  No

## 2024-07-12 LAB
C TRACH DNA SPEC QL NAA+PROBE: NEGATIVE
N GONORRHOEA DNA SPEC QL NAA+PROBE: NEGATIVE
SAMPLE TYPE: NORMAL
SERVICE CMNT-IMP: NORMAL
SPECIMEN SOURCE: NORMAL

## 2024-09-07 ENCOUNTER — APPOINTMENT (OUTPATIENT)
Facility: HOSPITAL | Age: 24
End: 2024-09-07
Payer: MEDICAID

## 2024-09-07 ENCOUNTER — HOSPITAL ENCOUNTER (EMERGENCY)
Facility: HOSPITAL | Age: 24
Discharge: HOME | End: 2024-09-07
Payer: MEDICAID

## 2024-09-07 VITALS
RESPIRATION RATE: 16 BRPM | SYSTOLIC BLOOD PRESSURE: 116 MMHG | DIASTOLIC BLOOD PRESSURE: 86 MMHG | WEIGHT: 264 LBS | HEART RATE: 98 BPM | BODY MASS INDEX: 48.58 KG/M2 | HEIGHT: 62 IN | TEMPERATURE: 98.2 F | OXYGEN SATURATION: 99 %

## 2024-09-07 DIAGNOSIS — F41.1 ANXIETY STATE: ICD-10-CM

## 2024-09-07 DIAGNOSIS — R07.89 CHEST WALL PAIN: Primary | ICD-10-CM

## 2024-09-07 LAB
ANION GAP SERPL CALC-SCNC: 6 MMOL/L (ref 2–12)
BASOPHILS # BLD: 0 K/UL (ref 0–0.1)
BASOPHILS NFR BLD: 0 % (ref 0–1)
BUN SERPL-MCNC: 15 MG/DL (ref 6–20)
BUN/CREAT SERPL: 13 (ref 12–20)
CA-I BLD-MCNC: 8.8 MG/DL (ref 8.5–10.1)
CHLORIDE SERPL-SCNC: 109 MMOL/L (ref 97–108)
CO2 SERPL-SCNC: 26 MMOL/L (ref 21–32)
CREAT SERPL-MCNC: 1.15 MG/DL (ref 0.55–1.02)
DIFFERENTIAL METHOD BLD: ABNORMAL
EOSINOPHIL # BLD: 0.1 K/UL (ref 0–0.4)
EOSINOPHIL NFR BLD: 2 % (ref 0–7)
ERYTHROCYTE [DISTWIDTH] IN BLOOD BY AUTOMATED COUNT: 14 % (ref 11.5–14.5)
GLUCOSE SERPL-MCNC: 79 MG/DL (ref 65–100)
HCT VFR BLD AUTO: 35.8 % (ref 35–47)
HGB BLD-MCNC: 11.4 G/DL (ref 11.5–16)
IMM GRANULOCYTES # BLD AUTO: 0 K/UL (ref 0–0.04)
IMM GRANULOCYTES NFR BLD AUTO: 0 % (ref 0–0.5)
LYMPHOCYTES # BLD: 2 K/UL (ref 0.8–3.5)
LYMPHOCYTES NFR BLD: 31 % (ref 12–49)
MCH RBC QN AUTO: 24.8 PG (ref 26–34)
MCHC RBC AUTO-ENTMCNC: 31.8 G/DL (ref 30–36.5)
MCV RBC AUTO: 78 FL (ref 80–99)
MONOCYTES # BLD: 0.5 K/UL (ref 0–1)
MONOCYTES NFR BLD: 8 % (ref 5–13)
NEUTS SEG # BLD: 3.6 K/UL (ref 1.8–8)
NEUTS SEG NFR BLD: 59 % (ref 32–75)
NRBC # BLD: 0 K/UL (ref 0–0.01)
NRBC BLD-RTO: 0 PER 100 WBC
PLATELET # BLD AUTO: 236 K/UL (ref 150–400)
PMV BLD AUTO: 10.9 FL (ref 8.9–12.9)
POTASSIUM SERPL-SCNC: 3.8 MMOL/L (ref 3.5–5.1)
RBC # BLD AUTO: 4.59 M/UL (ref 3.8–5.2)
SODIUM SERPL-SCNC: 141 MMOL/L (ref 136–145)
TROPONIN I SERPL HS-MCNC: <4 NG/L (ref 0–51)
WBC # BLD AUTO: 6.2 K/UL (ref 3.6–11)

## 2024-09-07 PROCEDURE — 6360000002 HC RX W HCPCS: Performed by: PHYSICIAN ASSISTANT

## 2024-09-07 PROCEDURE — 85025 COMPLETE CBC W/AUTO DIFF WBC: CPT

## 2024-09-07 PROCEDURE — 80048 BASIC METABOLIC PNL TOTAL CA: CPT

## 2024-09-07 PROCEDURE — 99284 EMERGENCY DEPT VISIT MOD MDM: CPT

## 2024-09-07 PROCEDURE — 96374 THER/PROPH/DIAG INJ IV PUSH: CPT

## 2024-09-07 PROCEDURE — 36415 COLL VENOUS BLD VENIPUNCTURE: CPT

## 2024-09-07 PROCEDURE — 71046 X-RAY EXAM CHEST 2 VIEWS: CPT

## 2024-09-07 PROCEDURE — 93005 ELECTROCARDIOGRAM TRACING: CPT | Performed by: PHYSICIAN ASSISTANT

## 2024-09-07 PROCEDURE — 84484 ASSAY OF TROPONIN QUANT: CPT

## 2024-09-07 RX ORDER — HYDROXYZINE HYDROCHLORIDE 25 MG/1
25 TABLET, FILM COATED ORAL EVERY 8 HOURS PRN
Qty: 30 TABLET | Refills: 0 | Status: SHIPPED | OUTPATIENT
Start: 2024-09-07 | End: 2024-09-17

## 2024-09-07 RX ORDER — NAPROXEN 500 MG/1
500 TABLET ORAL 2 TIMES DAILY PRN
Qty: 60 TABLET | Refills: 0 | Status: SHIPPED | OUTPATIENT
Start: 2024-09-07

## 2024-09-07 RX ORDER — KETOROLAC TROMETHAMINE 30 MG/ML
30 INJECTION, SOLUTION INTRAMUSCULAR; INTRAVENOUS
Status: COMPLETED | OUTPATIENT
Start: 2024-09-07 | End: 2024-09-07

## 2024-09-07 RX ADMIN — KETOROLAC TROMETHAMINE 30 MG: 30 INJECTION, SOLUTION INTRAMUSCULAR; INTRAVENOUS at 20:26

## 2024-09-07 ASSESSMENT — PAIN DESCRIPTION - LOCATION: LOCATION: CHEST

## 2024-09-07 ASSESSMENT — LIFESTYLE VARIABLES
HOW OFTEN DO YOU HAVE A DRINK CONTAINING ALCOHOL: NEVER
HOW MANY STANDARD DRINKS CONTAINING ALCOHOL DO YOU HAVE ON A TYPICAL DAY: PATIENT DOES NOT DRINK

## 2024-09-07 ASSESSMENT — PAIN SCALES - GENERAL
PAINLEVEL_OUTOF10: 1
PAINLEVEL_OUTOF10: 7
PAINLEVEL_OUTOF10: 8

## 2024-09-07 ASSESSMENT — PAIN - FUNCTIONAL ASSESSMENT: PAIN_FUNCTIONAL_ASSESSMENT: 0-10

## 2024-09-07 NOTE — ED TRIAGE NOTES
[facility] pt called EMS for worsening CP; pt is 2 weeks post partum and recently lost her brother; c/o BLE edema

## 2024-09-08 NOTE — DISCHARGE INSTRUCTIONS
Type AUTOMATED     Troponin    Collection Time: 09/07/24  6:34 PM   Result Value Ref Range    Troponin, High Sensitivity <4 0 - 51 ng/L       Radiologic Studies  XR CHEST (2 VW)   Final Result   No acute cardiopulmonary disease.         Electronically signed by Jeffry Alvarez MD        ------------------------------------------------------------------------------------------------------------  The evaluation and treatment you received in the Emergency Department were for an urgent problem. It is important that you follow-up with a doctor, nurse practitioner, or physician assistant to:  (1) confirm your diagnosis,  (2) re-evaluation of changes in your illness and treatment, and (3) for ongoing care. Please take your discharge instructions with you when you go to your follow-up appointment.     If you have any problem arranging a follow-up appointment, contact us!  If your symptoms become worse or you do not improve as expected, please return to us. We are available 24 hours a day.     If a prescription has been provided, please fill it as soon as possible to prevent a delay in treatment. If you have any questions or reservations about taking the medication due to side effects or interactions with other medications, please call your primary care provider or contact us directly.  Again, THANK YOU for choosing us to care for YOU!

## 2024-09-08 NOTE — ED PROVIDER NOTES
Conjunctivae normal.      Pupils: Pupils are equal, round, and reactive to light.   Cardiovascular:      Rate and Rhythm: Normal rate and regular rhythm.      Pulses: Normal pulses.      Heart sounds: Normal heart sounds.   Pulmonary:      Effort: Pulmonary effort is normal.      Breath sounds: Normal breath sounds.   Chest:      Chest wall: Tenderness present.          Comments: Chest: Patient has tenderness palpation midsternal extending to the right by approximately 3 cm.  Abdominal:      General: Abdomen is protuberant. Bowel sounds are normal.      Palpations: Abdomen is soft.      Tenderness: There is no abdominal tenderness.   Musculoskeletal:      Cervical back: Normal range of motion and neck supple.   Neurological:      Mental Status: She is alert.           SCREENINGS                  LAB, EKG AND DIAGNOSTIC RESULTS   Labs:  Recent Results (from the past 12 hour(s))   Basic Metabolic Panel    Collection Time: 09/07/24  6:34 PM   Result Value Ref Range    Sodium 141 136 - 145 mmol/L    Potassium 3.8 3.5 - 5.1 mmol/L    Chloride 109 (H) 97 - 108 mmol/L    CO2 26 21 - 32 mmol/L    Anion Gap 6 2 - 12 mmol/L    Glucose 79 65 - 100 mg/dL    BUN 15 6 - 20 mg/dL    Creatinine 1.15 (H) 0.55 - 1.02 mg/dL    BUN/Creatinine Ratio 13 12 - 20      Est, Glom Filt Rate 69 >60 ml/min/1.73m2    Calcium 8.8 8.5 - 10.1 mg/dL   CBC with Auto Differential    Collection Time: 09/07/24  6:34 PM   Result Value Ref Range    WBC 6.2 3.6 - 11.0 K/uL    RBC 4.59 3.80 - 5.20 M/uL    Hemoglobin 11.4 (L) 11.5 - 16.0 g/dL    Hematocrit 35.8 35.0 - 47.0 %    MCV 78.0 (L) 80.0 - 99.0 FL    MCH 24.8 (L) 26.0 - 34.0 PG    MCHC 31.8 30.0 - 36.5 g/dL    RDW 14.0 11.5 - 14.5 %    Platelets 236 150 - 400 K/uL    MPV 10.9 8.9 - 12.9 FL    Nucleated RBCs 0.0 0.0  WBC    nRBC 0.00 0.00 - 0.01 K/uL    Neutrophils % 59 32 - 75 %    Lymphocytes % 31 12 - 49 %    Monocytes % 8 5 - 13 %    Eosinophils % 2 0 - 7 %    Basophils % 0 0 - 1 %

## 2024-09-09 LAB
EKG ATRIAL RATE: 87 BPM
EKG DIAGNOSIS: NORMAL
EKG P AXIS: 24 DEGREES
EKG P-R INTERVAL: 142 MS
EKG Q-T INTERVAL: 380 MS
EKG QRS DURATION: 74 MS
EKG QTC CALCULATION (BAZETT): 457 MS
EKG R AXIS: 16 DEGREES
EKG T AXIS: 21 DEGREES
EKG VENTRICULAR RATE: 87 BPM

## 2024-10-28 ENCOUNTER — HOSPITAL ENCOUNTER (EMERGENCY)
Facility: HOSPITAL | Age: 24
Discharge: HOME | End: 2024-10-28
Attending: EMERGENCY MEDICINE
Payer: MEDICAID

## 2024-10-28 VITALS
BODY MASS INDEX: 31.65 KG/M2 | TEMPERATURE: 97.9 F | RESPIRATION RATE: 16 BRPM | SYSTOLIC BLOOD PRESSURE: 110 MMHG | WEIGHT: 172 LBS | OXYGEN SATURATION: 100 % | HEIGHT: 62 IN | DIASTOLIC BLOOD PRESSURE: 77 MMHG | HEART RATE: 73 BPM

## 2024-10-28 DIAGNOSIS — B34.9 ACUTE VIRAL SYNDROME: Primary | ICD-10-CM

## 2024-10-28 LAB
FLUAV RNA SPEC QL NAA+PROBE: NOT DETECTED
FLUBV RNA SPEC QL NAA+PROBE: NOT DETECTED
SARS-COV-2 RNA RESP QL NAA+PROBE: NOT DETECTED
SOURCE: NORMAL

## 2024-10-28 PROCEDURE — 87636 SARSCOV2 & INF A&B AMP PRB: CPT

## 2024-10-28 PROCEDURE — 99283 EMERGENCY DEPT VISIT LOW MDM: CPT

## 2024-10-28 PROCEDURE — 6370000000 HC RX 637 (ALT 250 FOR IP): Performed by: PHYSICIAN ASSISTANT

## 2024-10-28 RX ORDER — ACETAMINOPHEN 500 MG
1000 TABLET ORAL EVERY 6 HOURS PRN
Qty: 56 TABLET | Refills: 0 | Status: SHIPPED | OUTPATIENT
Start: 2024-10-28 | End: 2024-11-04

## 2024-10-28 RX ORDER — IBUPROFEN 600 MG/1
600 TABLET, FILM COATED ORAL EVERY 6 HOURS PRN
Qty: 28 TABLET | Refills: 0 | Status: SHIPPED | OUTPATIENT
Start: 2024-10-28 | End: 2024-11-04

## 2024-10-28 RX ORDER — IBUPROFEN 600 MG/1
600 TABLET, FILM COATED ORAL
Status: COMPLETED | OUTPATIENT
Start: 2024-10-28 | End: 2024-10-28

## 2024-10-28 RX ORDER — GUAIFENESIN 600 MG/1
600 TABLET, EXTENDED RELEASE ORAL 2 TIMES DAILY
Qty: 30 TABLET | Refills: 0 | Status: SHIPPED | OUTPATIENT
Start: 2024-10-28 | End: 2024-11-12

## 2024-10-28 RX ADMIN — IBUPROFEN 600 MG: 600 TABLET, FILM COATED ORAL at 23:23

## 2024-10-28 ASSESSMENT — ENCOUNTER SYMPTOMS
SORE THROAT: 1
COUGH: 1

## 2024-10-28 ASSESSMENT — PAIN DESCRIPTION - LOCATION: LOCATION: THROAT;NOSE

## 2024-10-28 ASSESSMENT — PAIN - FUNCTIONAL ASSESSMENT: PAIN_FUNCTIONAL_ASSESSMENT: 0-10

## 2024-10-28 ASSESSMENT — PAIN SCALES - GENERAL: PAINLEVEL_OUTOF10: 8

## 2024-10-28 ASSESSMENT — PAIN DESCRIPTION - DESCRIPTORS: DESCRIPTORS: ACHING

## 2024-10-29 NOTE — DISCHARGE INSTRUCTIONS
You can rotate ibuprofen and Tylenol every 4 hours as needed for sore throat, fever.  Mucinex can help with nasal congestion and cough.  Make sure that you stay well-hydrated with 32 to 64 ounces of water daily.  Take medication as prescribed.  Return immediately if any new or worsening symptoms.  Thank you for allowing us to be a part of your care.

## 2024-10-29 NOTE — ED TRIAGE NOTES
OT order received and chart reviewed. Pt has scheduled L foot procedure in the OR this date at 14:00, will follow up with pt following surgery for most thorough evaluation. Please update orders with any activity/weight baring restrictions following surgery.  Thank you Chet Almodovar OTRL Patient BIBA, walked to [facility]. EMS stated she noted \"not feeling well, rode the bus that stopped at [employer] and called EMS due to feeling cold.\" Patient c/o stuffy nose, cough and sore throat. Denies taking any meds. No fevers. 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

## 2024-10-29 NOTE — ED PROVIDER NOTES
[facility] [place] [place]GENCY DEPT  EMERGENCY DEPARTMENT ENCOUNTER      Pt Name: Malka Payton  MRN: [id number]  Birthdate 2000  Date of evaluation: 10/28/2024  Provider: Speedy Byrnes PA-C    CHIEF COMPLAINT       Chief Complaint   Patient presents with    Cough    Pharyngitis         HISTORY OF PRESENT ILLNESS   (Location/Symptom, Timing/Onset, Context/Setting, Quality, Duration, Modifying Factors, Severity)  Note limiting factors.   24-year-old female presenting to ED for not feeling well after riding the bus to admitting to feeling chills, nasal congestion, cough, sore throat onset today.  Patient requesting COVID test.            Review of External Medical Records:     Nursing Notes were reviewed.    REVIEW OF SYSTEMS    (2-9 systems for level 4, 10 or more for level 5)     Review of Systems   Constitutional:  Positive for chills.   HENT:  Positive for congestion and sore throat.    Respiratory:  Positive for cough.    All other systems reviewed and are negative.      Except as noted above the remainder of the review of systems was reviewed and negative.       PAST MEDICAL HISTORY     Past Medical History:   Diagnosis Date    Psychiatric disorder          SURGICAL HISTORY     No past surgical history on file.      CURRENT MEDICATIONS       Discharge Medication List as of 10/28/2024 11:20 PM        CONTINUE these medications which have NOT CHANGED    Details   cetirizine (ZYRTEC) 10 MG tablet Take 1 tablet by mouth daily, Disp-30 tablet, R-0Normal      fluticasone (FLONASE) 50 MCG/ACT nasal spray 1 spray by Each Nostril route daily, Disp-32 g, R-1Normal      amphetamine-dextroamphetamine (ADDERALL) 10 MG tablet Take 10 mg by mouth daily.Historical Med      benzoyl peroxide 5 % external liquid Apply topically, Topical, Historical Med      Cholecalciferol 50 MCG (2000 UT) CAPS Take 2,000 Units by mouth dailyHistorical Med      clindamycin (CLINDAGEL) 1 % gel 2 times daily, 2 TIMES DAILY, Historical Med

## 2024-10-29 NOTE — ED NOTES
Patient verbalizes feeling better, CC improvement, or symptoms not worsening at time of discharge. Pain reported as 0/10 at time of discharge This RN thoroughly reviewed discharge instructions with the patient being able to verbalize understanding.  VSS & PIV removed (n/a) and pt ambulatory with steady gait prior to discharge.  Patient discharged with unaccompanied})

## 2025-01-12 ENCOUNTER — HOSPITAL ENCOUNTER (EMERGENCY)
Facility: HOSPITAL | Age: 25
Discharge: LEFT BEFORE DISCHARGE | End: 2025-01-12

## 2025-01-12 VITALS
OXYGEN SATURATION: 99 % | SYSTOLIC BLOOD PRESSURE: 129 MMHG | HEART RATE: 83 BPM | RESPIRATION RATE: 18 BRPM | TEMPERATURE: 98.2 F | DIASTOLIC BLOOD PRESSURE: 76 MMHG | BODY MASS INDEX: 31.83 KG/M2 | WEIGHT: 173 LBS | HEIGHT: 62 IN

## 2025-01-12 ASSESSMENT — PAIN DESCRIPTION - DESCRIPTORS: DESCRIPTORS: CRAMPING

## 2025-01-12 ASSESSMENT — PAIN DESCRIPTION - LOCATION: LOCATION: ABDOMEN

## 2025-01-12 ASSESSMENT — PAIN SCALES - GENERAL: PAINLEVEL_OUTOF10: 8

## 2025-01-12 ASSESSMENT — PAIN - FUNCTIONAL ASSESSMENT: PAIN_FUNCTIONAL_ASSESSMENT: 0-10

## 2025-01-12 ASSESSMENT — PAIN DESCRIPTION - FREQUENCY: FREQUENCY: INTERMITTENT

## 2025-01-12 ASSESSMENT — PAIN DESCRIPTION - ORIENTATION: ORIENTATION: RIGHT;LEFT;LOWER

## 2025-01-12 NOTE — ED TRIAGE NOTES
Patient reports lower abdominal cramping and vomiting for three weeks.  She also c/o right foot pain and swelling for three years.

## 2025-01-21 ENCOUNTER — APPOINTMENT (OUTPATIENT)
Facility: HOSPITAL | Age: 25
End: 2025-01-21
Payer: MEDICAID

## 2025-01-21 ENCOUNTER — HOSPITAL ENCOUNTER (EMERGENCY)
Facility: HOSPITAL | Age: 25
Discharge: HOME | End: 2025-01-21
Attending: EMERGENCY MEDICINE
Payer: MEDICAID

## 2025-01-21 VITALS
OXYGEN SATURATION: 100 % | BODY MASS INDEX: 50.94 KG/M2 | HEART RATE: 77 BPM | RESPIRATION RATE: 20 BRPM | DIASTOLIC BLOOD PRESSURE: 77 MMHG | TEMPERATURE: 98.2 F | HEIGHT: 60 IN | WEIGHT: 259.48 LBS | SYSTOLIC BLOOD PRESSURE: 114 MMHG

## 2025-01-21 DIAGNOSIS — R10.84 GENERALIZED ABDOMINAL PAIN: Primary | ICD-10-CM

## 2025-01-21 LAB
ALBUMIN SERPL-MCNC: 3.5 G/DL (ref 3.5–5)
ALBUMIN/GLOB SERPL: 0.8 (ref 1.1–2.2)
ALP SERPL-CCNC: 52 U/L (ref 45–117)
ALT SERPL-CCNC: 20 U/L (ref 12–78)
ANION GAP SERPL CALC-SCNC: 2 MMOL/L (ref 2–12)
APPEARANCE UR: CLEAR
AST SERPL-CCNC: 14 U/L (ref 15–37)
BACTERIA URNS QL MICRO: ABNORMAL /HPF
BASOPHILS # BLD: 0.01 K/UL (ref 0–0.1)
BASOPHILS NFR BLD: 0.2 % (ref 0–1)
BILIRUB SERPL-MCNC: 0.5 MG/DL (ref 0.2–1)
BILIRUB UR QL: NEGATIVE
BUN SERPL-MCNC: 15 MG/DL (ref 6–20)
BUN/CREAT SERPL: 18 (ref 12–20)
CALCIUM SERPL-MCNC: 9.1 MG/DL (ref 8.5–10.1)
CHLORIDE SERPL-SCNC: 105 MMOL/L (ref 97–108)
CO2 SERPL-SCNC: 29 MMOL/L (ref 21–32)
COLOR UR: ABNORMAL
COMMENT:: NORMAL
CREAT SERPL-MCNC: 0.83 MG/DL (ref 0.55–1.02)
DIFFERENTIAL METHOD BLD: ABNORMAL
EKG ATRIAL RATE: 84 BPM
EKG DIAGNOSIS: NORMAL
EKG P AXIS: 38 DEGREES
EKG P-R INTERVAL: 162 MS
EKG Q-T INTERVAL: 372 MS
EKG QRS DURATION: 68 MS
EKG QTC CALCULATION (BAZETT): 439 MS
EKG R AXIS: 39 DEGREES
EKG T AXIS: 7 DEGREES
EKG VENTRICULAR RATE: 84 BPM
EOSINOPHIL # BLD: 0.04 K/UL (ref 0–0.4)
EOSINOPHIL NFR BLD: 0.8 % (ref 0–7)
EPITH CASTS URNS QL MICRO: ABNORMAL /LPF
ERYTHROCYTE [DISTWIDTH] IN BLOOD BY AUTOMATED COUNT: 14.2 % (ref 11.5–14.5)
FLUAV RNA SPEC QL NAA+PROBE: NOT DETECTED
FLUBV RNA SPEC QL NAA+PROBE: NOT DETECTED
GLOBULIN SER CALC-MCNC: 4.6 G/DL (ref 2–4)
GLUCOSE SERPL-MCNC: 88 MG/DL (ref 65–100)
GLUCOSE UR STRIP.AUTO-MCNC: NEGATIVE MG/DL
HCG UR QL: NEGATIVE
HCT VFR BLD AUTO: 40.3 % (ref 35–47)
HGB BLD-MCNC: 12.4 G/DL (ref 11.5–16)
HGB UR QL STRIP: NEGATIVE
HYALINE CASTS URNS QL MICRO: ABNORMAL /LPF (ref 0–5)
IMM GRANULOCYTES # BLD AUTO: 0.01 K/UL (ref 0–0.04)
IMM GRANULOCYTES NFR BLD AUTO: 0.2 % (ref 0–0.5)
KETONES UR QL STRIP.AUTO: NEGATIVE MG/DL
LEUKOCYTE ESTERASE UR QL STRIP.AUTO: NEGATIVE
LIPASE SERPL-CCNC: 27 U/L (ref 13–75)
LYMPHOCYTES # BLD: 1.76 K/UL (ref 0.8–3.5)
LYMPHOCYTES NFR BLD: 34.9 % (ref 12–49)
MCH RBC QN AUTO: 24.3 PG (ref 26–34)
MCHC RBC AUTO-ENTMCNC: 30.8 G/DL (ref 30–36.5)
MCV RBC AUTO: 79 FL (ref 80–99)
MONOCYTES # BLD: 0.53 K/UL (ref 0–1)
MONOCYTES NFR BLD: 10.5 % (ref 5–13)
NEUTS SEG # BLD: 2.69 K/UL (ref 1.8–8)
NEUTS SEG NFR BLD: 53.4 % (ref 32–75)
NITRITE UR QL STRIP.AUTO: NEGATIVE
NRBC # BLD: 0 K/UL (ref 0–0.01)
NRBC BLD-RTO: 0 PER 100 WBC
PH UR STRIP: 7.5 (ref 5–8)
PLATELET # BLD AUTO: 241 K/UL (ref 150–400)
PMV BLD AUTO: 11.1 FL (ref 8.9–12.9)
POTASSIUM SERPL-SCNC: 3.7 MMOL/L (ref 3.5–5.1)
PROT SERPL-MCNC: 8.1 G/DL (ref 6.4–8.2)
PROT UR STRIP-MCNC: NEGATIVE MG/DL
RBC # BLD AUTO: 5.1 M/UL (ref 3.8–5.2)
RBC #/AREA URNS HPF: ABNORMAL /HPF (ref 0–5)
SARS-COV-2 RNA RESP QL NAA+PROBE: NOT DETECTED
SODIUM SERPL-SCNC: 136 MMOL/L (ref 136–145)
SOURCE: NORMAL
SP GR UR REFRACTOMETRY: 1.03 (ref 1–1.03)
SPECIMEN HOLD: NORMAL
SPECIMEN HOLD: NORMAL
TROPONIN I SERPL HS-MCNC: <4 NG/L (ref 0–51)
UROBILINOGEN UR QL STRIP.AUTO: 1 EU/DL (ref 0.2–1)
WBC # BLD AUTO: 5 K/UL (ref 3.6–11)
WBC URNS QL MICRO: ABNORMAL /HPF (ref 0–4)

## 2025-01-21 PROCEDURE — 99285 EMERGENCY DEPT VISIT HI MDM: CPT

## 2025-01-21 PROCEDURE — 83690 ASSAY OF LIPASE: CPT

## 2025-01-21 PROCEDURE — 71046 X-RAY EXAM CHEST 2 VIEWS: CPT

## 2025-01-21 PROCEDURE — 93005 ELECTROCARDIOGRAM TRACING: CPT

## 2025-01-21 PROCEDURE — 93010 ELECTROCARDIOGRAM REPORT: CPT | Performed by: SPECIALIST

## 2025-01-21 PROCEDURE — 84484 ASSAY OF TROPONIN QUANT: CPT

## 2025-01-21 PROCEDURE — 85025 COMPLETE CBC W/AUTO DIFF WBC: CPT

## 2025-01-21 PROCEDURE — 87636 SARSCOV2 & INF A&B AMP PRB: CPT

## 2025-01-21 PROCEDURE — 81001 URINALYSIS AUTO W/SCOPE: CPT

## 2025-01-21 PROCEDURE — 80053 COMPREHEN METABOLIC PANEL: CPT

## 2025-01-21 PROCEDURE — 81025 URINE PREGNANCY TEST: CPT

## 2025-01-21 PROCEDURE — 36415 COLL VENOUS BLD VENIPUNCTURE: CPT

## 2025-01-21 RX ORDER — ONDANSETRON 4 MG/1
4 TABLET, ORALLY DISINTEGRATING ORAL 3 TIMES DAILY PRN
Qty: 21 TABLET | Refills: 0 | Status: SHIPPED | OUTPATIENT
Start: 2025-01-21

## 2025-01-21 RX ORDER — FAMOTIDINE 20 MG/1
20 TABLET, FILM COATED ORAL 2 TIMES DAILY
Qty: 60 TABLET | Refills: 0 | Status: SHIPPED | OUTPATIENT
Start: 2025-01-21

## 2025-01-21 ASSESSMENT — PAIN DESCRIPTION - FREQUENCY: FREQUENCY: CONTINUOUS

## 2025-01-21 ASSESSMENT — PAIN DESCRIPTION - DESCRIPTORS: DESCRIPTORS: ACHING

## 2025-01-21 ASSESSMENT — PAIN SCALES - GENERAL: PAINLEVEL_OUTOF10: 7

## 2025-01-21 ASSESSMENT — PAIN DESCRIPTION - LOCATION: LOCATION: CHEST;HEAD

## 2025-01-21 ASSESSMENT — PAIN DESCRIPTION - PAIN TYPE: TYPE: ACUTE PAIN

## 2025-01-21 ASSESSMENT — PAIN - FUNCTIONAL ASSESSMENT: PAIN_FUNCTIONAL_ASSESSMENT: ACTIVITIES ARE NOT PREVENTED

## 2025-01-21 ASSESSMENT — PAIN DESCRIPTION - ORIENTATION: ORIENTATION: ANTERIOR

## 2025-01-21 ASSESSMENT — PAIN DESCRIPTION - ONSET: ONSET: ON-GOING

## 2025-01-21 NOTE — DISCHARGE INSTRUCTIONS
We discussed your results today.  Is important that you follow-up with your primary care physician in the next couple days for further evaluation.  Return to the emergency department if you develop worsening abdominal pain, fever, chills.

## 2025-01-21 NOTE — ED TRIAGE NOTES
Patient is coming in with chest pain and headache and having a hard time swallowing. Patient states has been going on for 1 week. Patient had seizure 1 week ago in sleep. + nausea and vomiting blood.

## 2025-01-21 NOTE — ED PROVIDER NOTES
[facility] EMERGENCY DEPARTMENT  EMERGENCY DEPARTMENT ENCOUNTER      Pt Name: Malka Payton  MRN: [id number]  Birthdate 2000  Date of evaluation: 1/21/2025  Provider: Speedy Latham PA-C    CHIEF COMPLAINT       Chief Complaint   Patient presents with    Abdominal Pain         HISTORY OF PRESENT ILLNESS   (Location/Symptom, Timing/Onset, Context/Setting, Quality, Duration, Modifying Factors, Severity)  Note limiting factors.   Malka Payton is a 24 y.o. female who presents to the emergency department chest pain for the past week. She states she has a sore throat which is worse when she tries to eat as well as vomiting.  Denies fever, chills, nausea, abdominal pain, diarrhea, constipation, or SOB. She has been taking OTC ibuprofen which has provided her with relief.    The history is provided by the patient.         Review of External Medical Records:     Nursing Notes were reviewed.    REVIEW OF SYSTEMS    (2-9 systems for level 4, 10 or more for level 5)     Review of Systems    Except as noted above the remainder of the review of systems was reviewed and negative.       PAST MEDICAL HISTORY     Past Medical History:   Diagnosis Date    Psychiatric disorder          SURGICAL HISTORY     History reviewed. No pertinent surgical history.      CURRENT MEDICATIONS       Discharge Medication List as of 1/21/2025  2:05 PM        CONTINUE these medications which have NOT CHANGED    Details   acetaminophen (TYLENOL) 500 MG tablet Take 2 tablets by mouth every 6 hours as needed for Pain, Disp-56 tablet, R-0Normal      ibuprofen (ADVIL;MOTRIN) 600 MG tablet Take 1 tablet by mouth every 6 hours as needed for Pain, Disp-28 tablet, R-0Normal      cetirizine (ZYRTEC) 10 MG tablet Take 1 tablet by mouth daily, Disp-30 tablet, R-0Normal      fluticasone (FLONASE) 50 MCG/ACT nasal spray 1 spray by Each Nostril route daily, Disp-32 g, R-1Normal      amphetamine-dextroamphetamine (ADDERALL) 10 MG tablet Take 10 mg by

## 2025-02-03 ENCOUNTER — HOSPITAL ENCOUNTER (EMERGENCY)
Facility: HOSPITAL | Age: 25
Discharge: HOME | End: 2025-02-03
Attending: EMERGENCY MEDICINE
Payer: MEDICAID

## 2025-02-03 VITALS
SYSTOLIC BLOOD PRESSURE: 120 MMHG | HEART RATE: 87 BPM | OXYGEN SATURATION: 99 % | TEMPERATURE: 98.3 F | RESPIRATION RATE: 19 BRPM | DIASTOLIC BLOOD PRESSURE: 62 MMHG

## 2025-02-03 DIAGNOSIS — R56.9 SEIZURE-LIKE ACTIVITY (HCC): Primary | ICD-10-CM

## 2025-02-03 LAB
ALBUMIN SERPL-MCNC: 3.1 G/DL (ref 3.5–5)
ALBUMIN/GLOB SERPL: 0.7 (ref 1.1–2.2)
ALP SERPL-CCNC: 52 U/L (ref 45–117)
ALT SERPL-CCNC: 21 U/L (ref 12–78)
ANION GAP SERPL CALC-SCNC: 4 MMOL/L (ref 2–12)
AST SERPL-CCNC: 14 U/L (ref 15–37)
BASOPHILS # BLD: 0 K/UL (ref 0–0.1)
BASOPHILS NFR BLD: 0 % (ref 0–1)
BILIRUB SERPL-MCNC: 0.2 MG/DL (ref 0.2–1)
BUN SERPL-MCNC: 16 MG/DL (ref 6–20)
BUN/CREAT SERPL: 15 (ref 12–20)
CALCIUM SERPL-MCNC: 8.6 MG/DL (ref 8.5–10.1)
CHLORIDE SERPL-SCNC: 107 MMOL/L (ref 97–108)
CO2 SERPL-SCNC: 27 MMOL/L (ref 21–32)
COMMENT:: NORMAL
CREAT SERPL-MCNC: 1.05 MG/DL (ref 0.55–1.02)
DIFFERENTIAL METHOD BLD: ABNORMAL
EKG ATRIAL RATE: 83 BPM
EKG DIAGNOSIS: NORMAL
EKG P AXIS: 14 DEGREES
EKG P-R INTERVAL: 122 MS
EKG Q-T INTERVAL: 372 MS
EKG QRS DURATION: 78 MS
EKG QTC CALCULATION (BAZETT): 437 MS
EKG R AXIS: 57 DEGREES
EKG T AXIS: 27 DEGREES
EKG VENTRICULAR RATE: 83 BPM
EOSINOPHIL # BLD: 0 K/UL (ref 0–0.4)
EOSINOPHIL NFR BLD: 0 % (ref 0–7)
ERYTHROCYTE [DISTWIDTH] IN BLOOD BY AUTOMATED COUNT: 14 % (ref 11.5–14.5)
ETHANOL SERPL-MCNC: <10 MG/DL (ref 0–0.08)
GLOBULIN SER CALC-MCNC: 4.3 G/DL (ref 2–4)
GLUCOSE SERPL-MCNC: 110 MG/DL (ref 65–100)
HCG SERPL-ACNC: <1 MIU/ML (ref 0–6)
HCT VFR BLD AUTO: 37.2 % (ref 35–47)
HGB BLD-MCNC: 11.6 G/DL (ref 11.5–16)
IMM GRANULOCYTES # BLD AUTO: 0 K/UL
IMM GRANULOCYTES NFR BLD AUTO: 0 %
LYMPHOCYTES # BLD: 2.24 K/UL (ref 0.8–3.5)
LYMPHOCYTES NFR BLD: 38 % (ref 12–49)
MCH RBC QN AUTO: 24.7 PG (ref 26–34)
MCHC RBC AUTO-ENTMCNC: 31.2 G/DL (ref 30–36.5)
MCV RBC AUTO: 79.3 FL (ref 80–99)
MONOCYTES # BLD: 0.24 K/UL (ref 0–1)
MONOCYTES NFR BLD: 4 % (ref 5–13)
NEUTS SEG # BLD: 3.42 K/UL (ref 1.8–8)
NEUTS SEG NFR BLD: 58 % (ref 32–75)
NRBC # BLD: 0 K/UL (ref 0–0.01)
NRBC BLD-RTO: 0 PER 100 WBC
PLATELET # BLD AUTO: 218 K/UL (ref 150–400)
PMV BLD AUTO: 11.4 FL (ref 8.9–12.9)
POTASSIUM SERPL-SCNC: 3.8 MMOL/L (ref 3.5–5.1)
PROT SERPL-MCNC: 7.4 G/DL (ref 6.4–8.2)
RBC # BLD AUTO: 4.69 M/UL (ref 3.8–5.2)
RBC MORPH BLD: ABNORMAL
SODIUM SERPL-SCNC: 138 MMOL/L (ref 136–145)
SPECIMEN HOLD: NORMAL
TROPONIN I SERPL HS-MCNC: <4 NG/L (ref 0–51)
WBC # BLD AUTO: 5.9 K/UL (ref 3.6–11)
WBC MORPH BLD: ABNORMAL

## 2025-02-03 PROCEDURE — 36415 COLL VENOUS BLD VENIPUNCTURE: CPT

## 2025-02-03 PROCEDURE — 93005 ELECTROCARDIOGRAM TRACING: CPT | Performed by: EMERGENCY MEDICINE

## 2025-02-03 PROCEDURE — 93010 ELECTROCARDIOGRAM REPORT: CPT | Performed by: SPECIALIST

## 2025-02-03 PROCEDURE — 84484 ASSAY OF TROPONIN QUANT: CPT

## 2025-02-03 PROCEDURE — 80053 COMPREHEN METABOLIC PANEL: CPT

## 2025-02-03 PROCEDURE — 84702 CHORIONIC GONADOTROPIN TEST: CPT

## 2025-02-03 PROCEDURE — 82077 ASSAY SPEC XCP UR&BREATH IA: CPT

## 2025-02-03 PROCEDURE — 85025 COMPLETE CBC W/AUTO DIFF WBC: CPT

## 2025-02-03 PROCEDURE — 99284 EMERGENCY DEPT VISIT MOD MDM: CPT

## 2025-02-03 ASSESSMENT — PAIN DESCRIPTION - LOCATION: LOCATION: CHEST

## 2025-02-03 ASSESSMENT — PAIN DESCRIPTION - DESCRIPTORS: DESCRIPTORS: SHARP

## 2025-02-03 ASSESSMENT — ENCOUNTER SYMPTOMS
CONSTIPATION: 0
BACK PAIN: 0
VOMITING: 0
DIARRHEA: 0
SHORTNESS OF BREATH: 0
COLOR CHANGE: 0
ABDOMINAL PAIN: 0
NAUSEA: 0

## 2025-02-03 ASSESSMENT — PAIN - FUNCTIONAL ASSESSMENT
PAIN_FUNCTIONAL_ASSESSMENT: ACTIVITIES ARE NOT PREVENTED
PAIN_FUNCTIONAL_ASSESSMENT: 0-10

## 2025-02-03 ASSESSMENT — PAIN DESCRIPTION - ONSET: ONSET: SUDDEN

## 2025-02-03 ASSESSMENT — PAIN SCALES - GENERAL: PAINLEVEL_OUTOF10: 8

## 2025-02-03 ASSESSMENT — PAIN DESCRIPTION - FREQUENCY: FREQUENCY: CONTINUOUS

## 2025-02-03 ASSESSMENT — PAIN DESCRIPTION - PAIN TYPE: TYPE: ACUTE PAIN

## 2025-02-03 NOTE — ED PROVIDER NOTES
[facility] EMERGENCY DEPARTMENT  EMERGENCY DEPARTMENT ENCOUNTER      Pt Name: Malka Payton  MRN: [id number]  Birthdate 2000  Date of evaluation: 2/3/2025  Provider: Arnoldo Bishop MD    CHIEF COMPLAINT       Chief Complaint   Patient presents with    Seizures         HISTORY OF PRESENT ILLNESS   (Location/Symptom, Timing/Onset, Context/Setting, Quality, Duration, Modifying Factors, Severity)  Note limiting factors.   Malka Payton is a 24 y.o. female who presents to the emergency department      The history is provided by the patient and the spouse. No  was used.   Seizures  Seizure activity on arrival: no    Seizure type:  Myoclonic  Preceding symptoms: panic    Initial focality:  None  Episode characteristics: stiffening    Postictal symptoms: memory loss    Postictal symptoms: no confusion and no somnolence    Return to baseline: yes    Severity:  Moderate  Timing:  Once  Progression:  Resolved  Recent head injury:  No recent head injuries  PTA treatment:  None  History of seizures: no        Nursing Notes were reviewed.    REVIEW OF SYSTEMS    (2-9 systems for level 4, 10 or more for level 5)     Review of Systems   Constitutional:  Negative for activity change, chills and fever.   HENT:  Negative for nosebleeds.    Eyes:  Negative for visual disturbance.   Respiratory:  Negative for shortness of breath.    Cardiovascular:  Negative for chest pain and palpitations.   Gastrointestinal:  Negative for abdominal pain, constipation, diarrhea, nausea and vomiting.   Genitourinary:  Negative for difficulty urinating, dysuria, hematuria and urgency.   Musculoskeletal:  Negative for back pain, neck pain and neck stiffness.   Skin:  Negative for color change.   Allergic/Immunologic: Negative for immunocompromised state.   Neurological:  Positive for seizures. Negative for dizziness, syncope, weakness, light-headedness, numbness and headaches.   Psychiatric/Behavioral:  Negative for behavioral

## 2025-02-03 NOTE — ED TRIAGE NOTES
Patient BIBA from home with a CC of seizure-like activity. Patient's significant other stated she woke him up saying she needed to go to the hospital and then she \"collapsed on the floor.\"

## 2025-02-03 NOTE — ED NOTES
RN entered room to advise pt that urine was still needed. Pt states \"I want to go home\". Pt asked if she could provide spec before discharge. Pt states no, she's too tired. ED physician aware and pt OK to be discharged. Pt advised that she cannot drive until cleared by neurology. Pt shown on discharge instructions where neuro number is. Pt and visitor verbalized understanding. Pt is also refusing another BP as the cuff was hurting her arm.

## 2025-02-03 NOTE — ED NOTES
Per visitor at bedside, pt was in bed and stated twice \"I need to go to the hospital\". Friend then said that she fell on the floor and appeared to be having a seizure. States it lasted 2 minutes and by the time EMS arrived, pt was back at baseline. Pt states she doesn't remember event. IV access established, labs obtained and sent. EKG completed and handed to physician. Seizure pads in place.

## 2025-03-07 ENCOUNTER — HOSPITAL ENCOUNTER (EMERGENCY)
Facility: HOSPITAL | Age: 25
Discharge: HOME | End: 2025-03-08
Attending: EMERGENCY MEDICINE
Payer: MEDICAID

## 2025-03-07 DIAGNOSIS — J11.1 INFLUENZA WITH RESPIRATORY MANIFESTATION OTHER THAN PNEUMONIA: ICD-10-CM

## 2025-03-07 DIAGNOSIS — J02.0 ACUTE STREPTOCOCCAL PHARYNGITIS: Primary | ICD-10-CM

## 2025-03-07 DIAGNOSIS — J01.90 ACUTE NON-RECURRENT SINUSITIS, UNSPECIFIED LOCATION: ICD-10-CM

## 2025-03-07 DIAGNOSIS — B34.9 ACUTE VIRAL SYNDROME: ICD-10-CM

## 2025-03-07 PROCEDURE — 99283 EMERGENCY DEPT VISIT LOW MDM: CPT

## 2025-03-07 PROCEDURE — 87636 SARSCOV2 & INF A&B AMP PRB: CPT

## 2025-03-07 PROCEDURE — 87651 STREP A DNA AMP PROBE: CPT

## 2025-03-07 RX ORDER — DEXAMETHASONE SODIUM PHOSPHATE 10 MG/ML
10 INJECTION, SOLUTION INTRAMUSCULAR; INTRAVENOUS ONCE
Status: COMPLETED | OUTPATIENT
Start: 2025-03-07 | End: 2025-03-08

## 2025-03-07 RX ORDER — GUAIFENESIN 600 MG/1
600 TABLET, EXTENDED RELEASE ORAL
Status: COMPLETED | OUTPATIENT
Start: 2025-03-07 | End: 2025-03-08

## 2025-03-07 RX ORDER — ACETAMINOPHEN 500 MG
1000 TABLET ORAL
Status: COMPLETED | OUTPATIENT
Start: 2025-03-07 | End: 2025-03-08

## 2025-03-07 ASSESSMENT — PAIN DESCRIPTION - ORIENTATION: ORIENTATION: INNER

## 2025-03-07 ASSESSMENT — PAIN - FUNCTIONAL ASSESSMENT: PAIN_FUNCTIONAL_ASSESSMENT: 0-10

## 2025-03-07 ASSESSMENT — PAIN DESCRIPTION - LOCATION: LOCATION: THROAT

## 2025-03-07 ASSESSMENT — PAIN DESCRIPTION - DESCRIPTORS: DESCRIPTORS: SORE

## 2025-03-08 VITALS
HEIGHT: 62 IN | OXYGEN SATURATION: 99 % | RESPIRATION RATE: 23 BRPM | BODY MASS INDEX: 49.5 KG/M2 | SYSTOLIC BLOOD PRESSURE: 138 MMHG | TEMPERATURE: 99.5 F | WEIGHT: 269 LBS | DIASTOLIC BLOOD PRESSURE: 94 MMHG | HEART RATE: 97 BPM

## 2025-03-08 LAB
FLUAV RNA SPEC QL NAA+PROBE: NOT DETECTED
FLUBV RNA SPEC QL NAA+PROBE: DETECTED
S PYO DNA THROAT QL NAA+PROBE: DETECTED
SARS-COV-2 RNA RESP QL NAA+PROBE: NOT DETECTED
SOURCE: ABNORMAL

## 2025-03-08 PROCEDURE — 6360000002 HC RX W HCPCS: Performed by: EMERGENCY MEDICINE

## 2025-03-08 PROCEDURE — 6370000000 HC RX 637 (ALT 250 FOR IP): Performed by: EMERGENCY MEDICINE

## 2025-03-08 RX ORDER — AMOXICILLIN 500 MG/1
500 CAPSULE ORAL 2 TIMES DAILY
Qty: 20 CAPSULE | Refills: 0 | Status: SHIPPED | OUTPATIENT
Start: 2025-03-08 | End: 2025-03-18

## 2025-03-08 RX ORDER — GUAIFENESIN/DEXTROMETHORPHAN 100-10MG/5
5 SYRUP ORAL 3 TIMES DAILY PRN
Qty: 120 ML | Refills: 0 | Status: SHIPPED | OUTPATIENT
Start: 2025-03-08 | End: 2025-03-18

## 2025-03-08 RX ORDER — PREDNISONE 5 MG/1
TABLET ORAL
Qty: 21 EACH | Refills: 0 | Status: SHIPPED | OUTPATIENT
Start: 2025-03-08 | End: 2025-03-08

## 2025-03-08 RX ORDER — ACETAMINOPHEN 325 MG/1
650 TABLET ORAL EVERY 6 HOURS PRN
Qty: 120 TABLET | Refills: 0 | Status: SHIPPED | OUTPATIENT
Start: 2025-03-08

## 2025-03-08 RX ORDER — GUAIFENESIN/DEXTROMETHORPHAN 100-10MG/5
5 SYRUP ORAL 3 TIMES DAILY PRN
Qty: 120 ML | Refills: 0 | Status: SHIPPED | OUTPATIENT
Start: 2025-03-08 | End: 2025-03-08

## 2025-03-08 RX ORDER — PREDNISONE 5 MG/1
TABLET ORAL
Qty: 21 EACH | Refills: 0 | Status: SHIPPED | OUTPATIENT
Start: 2025-03-08

## 2025-03-08 RX ORDER — ACETAMINOPHEN 325 MG/1
650 TABLET ORAL EVERY 6 HOURS PRN
Qty: 120 TABLET | Refills: 0 | Status: SHIPPED | OUTPATIENT
Start: 2025-03-08 | End: 2025-03-08

## 2025-03-08 RX ORDER — AMOXICILLIN 500 MG/1
500 CAPSULE ORAL 2 TIMES DAILY
Qty: 20 CAPSULE | Refills: 0 | Status: SHIPPED | OUTPATIENT
Start: 2025-03-08 | End: 2025-03-08

## 2025-03-08 RX ADMIN — ACETAMINOPHEN 1000 MG: 500 TABLET ORAL at 00:11

## 2025-03-08 RX ADMIN — GUAIFENESIN 600 MG: 600 TABLET ORAL at 00:12

## 2025-03-08 RX ADMIN — DEXAMETHASONE SODIUM PHOSPHATE 10 MG: 10 INJECTION, SOLUTION INTRAMUSCULAR; INTRAVENOUS at 00:12

## 2025-03-08 ASSESSMENT — ENCOUNTER SYMPTOMS
DIARRHEA: 0
SHORTNESS OF BREATH: 0
RHINORRHEA: 0
NAUSEA: 0
VOMITING: 0
ABDOMINAL PAIN: 0
SORE THROAT: 1
EYE PAIN: 0
COUGH: 1

## 2025-03-08 NOTE — ED PROVIDER NOTES
EMERGENCY DEPARTMENT HISTORY AND PHYSICAL EXAM            Please note that this dictation was completed with the assistance of \"Dragon\", the computer voice recognition software. Quite often unanticipated grammatical, syntax, homophones, and other interpretive errors are inadvertently transcribed by the computer software. Please disregard these errors and any errors that have escaped final proofreading. Thank you.    Date of Evaluation: 03/08/25  Patient: Malka Payton  Patient Age and Sex: 24 y.o. female   MRN: [id number]  CSN: [id number]  PCP: Gold Peña MD    History of Present Illness     Chief Complaint   Patient presents with    Cold Symptoms     Congestion, cough     History Provided By: Patient/family/EMS (if available)    History is limited by: Nothing     HPI: Malka Payton, 24 y.o. female with past medical history as documented below presents to the ED with c/o of 2-week history of intermittent cough, congestion and sore throat.  Patient notes that her daughter was recently diagnosed with influenza.  Patient reports no difficulty swallowing or voice changes, no fevers, no chest pain.. Pt denies any other exacerbating or ameliorating factors. There are no other complaints, changes or physical findings pertinent to the HPI at this time.    Nursing notes were all reviewed and agreed with or any disagreements were addressed in the HPI.    Past History   Past Medical History:  Past Medical History:   Diagnosis Date    Psychiatric disorder        Past Surgical History:  No past surgical history on file.    Family History:   Family history reviewed and was non-contributory, unless specified below:  No family history on file.    Social History:  Social History     Tobacco Use    Smoking status: Never    Smokeless tobacco: Never   Vaping Use    Vaping status: Former   Substance Use Topics    Alcohol use: Never     Comment: occasionally    Drug use: Never     Types: Marijuana (Weed)  precautions as discussed with patient and available family/caregiver.     2. DISCHARGE MEDICATIONS:     Medication List        START taking these medications      amoxicillin 500 MG capsule  Commonly known as: AMOXIL  Take 1 capsule by mouth 2 times daily for 10 days     guaiFENesin-dextromethorphan 100-10 MG/5ML syrup  Commonly known as: ROBITUSSIN DM  Take 5 mLs by mouth 3 times daily as needed for Cough     predniSONE 5 MG (21) Tbpk  Use as directed            CHANGE how you take these medications      * acetaminophen 500 MG tablet  Commonly known as: TYLENOL  Take 2 tablets by mouth every 6 hours as needed for Pain  What changed: Another medication with the same name was added. Make sure you understand how and when to take each.     * acetaminophen 325 MG tablet  Commonly known as: Aminofen  Take 2 tablets by mouth every 6 hours as needed for Pain  What changed: You were already taking a medication with the same name, and this prescription was added. Make sure you understand how and when to take each.           * This list has 2 medication(s) that are the same as other medications prescribed for you. Read the directions carefully, and ask your doctor or other care provider to review them with you.                ASK your doctor about these medications      amphetamine-dextroamphetamine 10 MG tablet  Commonly known as: ADDERALL     benzoyl peroxide 5 % external liquid     cetirizine 10 MG tablet  Commonly known as: ZYRTEC  Take 1 tablet by mouth daily     clindamycin 1 % gel     famotidine 20 MG tablet  Commonly known as: Pepcid  Take 1 tablet by mouth 2 times daily     fluticasone 50 MCG/ACT nasal spray  Commonly known as: FLONASE  1 spray by Each Nostril route daily     Fluticasone Propionate (Inhal) 50 MCG/ACT diskus inhaler  Commonly known as: FLOVENT DISKUS     ibuprofen 600 MG tablet  Commonly known as: ADVIL;MOTRIN  Take 1 tablet by mouth every 6 hours as needed for Pain     ondansetron 4 MG disintegrating

## 2025-03-08 NOTE — ED NOTES
Discharge instructions were given to the patient by kimberlyn thorpe RN.     The patient left the Emergency Department alert and oriented and in no acute distress with 3 prescriptions. The patient was encouraged to call or return to the ED for worsening issues or problems and was encouraged to schedule a follow up appointment for continuing care.     Ambulation assessment completed before discharge.  Pt left Emergency Department ambulating at baseline with no ortho devices  Ortho device education: none    The patient verbalized understanding of discharge instructions and prescriptions, all questions were answered. The patient has no further concerns at this time.

## 2025-03-08 NOTE — ED TRIAGE NOTES
Pt presents to ED with CC cough, congestion, sore throat x2 weeks  Pt states daughter diagnosed with flu  No meds taken for symptoms

## 2025-03-11 ENCOUNTER — HOSPITAL ENCOUNTER (EMERGENCY)
Facility: HOSPITAL | Age: 25
Discharge: HOME | End: 2025-03-11
Attending: EMERGENCY MEDICINE
Payer: MEDICAID

## 2025-03-11 VITALS
TEMPERATURE: 97 F | RESPIRATION RATE: 18 BRPM | SYSTOLIC BLOOD PRESSURE: 118 MMHG | OXYGEN SATURATION: 95 % | HEART RATE: 96 BPM | WEIGHT: 269.62 LBS | DIASTOLIC BLOOD PRESSURE: 86 MMHG | BODY MASS INDEX: 49.31 KG/M2

## 2025-03-11 DIAGNOSIS — R05.1 ACUTE COUGH: Primary | ICD-10-CM

## 2025-03-11 PROCEDURE — 99282 EMERGENCY DEPT VISIT SF MDM: CPT

## 2025-03-11 ASSESSMENT — PAIN SCALES - GENERAL: PAINLEVEL_OUTOF10: 9

## 2025-03-11 ASSESSMENT — PAIN DESCRIPTION - ORIENTATION: ORIENTATION: MID

## 2025-03-11 ASSESSMENT — PAIN DESCRIPTION - PAIN TYPE: TYPE: ACUTE PAIN

## 2025-03-11 ASSESSMENT — PAIN DESCRIPTION - ONSET: ONSET: ON-GOING

## 2025-03-11 ASSESSMENT — PAIN DESCRIPTION - DESCRIPTORS: DESCRIPTORS: ACHING

## 2025-03-11 ASSESSMENT — PAIN DESCRIPTION - FREQUENCY: FREQUENCY: INTERMITTENT

## 2025-03-11 ASSESSMENT — PAIN - FUNCTIONAL ASSESSMENT
PAIN_FUNCTIONAL_ASSESSMENT: 0-10
PAIN_FUNCTIONAL_ASSESSMENT: ACTIVITIES ARE NOT PREVENTED

## 2025-03-11 ASSESSMENT — PAIN DESCRIPTION - LOCATION: LOCATION: THROAT

## 2025-03-11 NOTE — ED PROVIDER NOTES
[facility] EMERGENCY DEPARTMENT  EMERGENCY DEPARTMENT ENCOUNTER      Pt Name: Malka Payton  MRN: [id number]  Birthdate 2000  Date of evaluation: 3/11/2025  Provider: Speedy Latham PA-C    CHIEF COMPLAINT       Chief Complaint   Patient presents with    Influenza         HISTORY OF PRESENT ILLNESS   (Location/Symptom, Timing/Onset, Context/Setting, Quality, Duration, Modifying Factors, Severity)  Note limiting factors.   Malka Payton is a 24 y.o. female who presents to the emergency department for congestion and sore throat for the past 5 days.  Patient states she was previously seen and diagnosed with influenza and strep pharyngitis.  She was provided antibiotics which she states she has been taking.  She denies any fever, chills, nausea, vomiting, diarrhea, chest pain, shortness of breath, abdominal pain.  She states she wants to be retested to see if she still has the flu and strep.  She notes of improvement of her symptoms since onset.       The history is provided by the patient.         Review of External Medical Records:     Nursing Notes were reviewed.    REVIEW OF SYSTEMS    (2-9 systems for level 4, 10 or more for level 5)     Review of Systems    Except as noted above the remainder of the review of systems was reviewed and negative.       PAST MEDICAL HISTORY     Past Medical History:   Diagnosis Date    Psychiatric disorder          SURGICAL HISTORY     History reviewed. No pertinent surgical history.      CURRENT MEDICATIONS       Discharge Medication List as of 3/11/2025  7:57 PM        CONTINUE these medications which have NOT CHANGED    Details   acetaminophen (AMINOFEN) 325 MG tablet Take 2 tablets by mouth every 6 hours as needed for Pain, Disp-120 tablet, R-0Normal      amoxicillin (AMOXIL) 500 MG capsule Take 1 capsule by mouth 2 times daily for 10 days, Disp-20 capsule, R-0Normal      guaiFENesin-dextromethorphan (ROBITUSSIN DM) 100-10 MG/5ML syrup Take 5 mLs by mouth 3 times

## 2025-08-17 ENCOUNTER — HOSPITAL ENCOUNTER (EMERGENCY)
Facility: HOSPITAL | Age: 25
Discharge: HOME | End: 2025-08-17
Attending: EMERGENCY MEDICINE

## 2025-08-17 VITALS
TEMPERATURE: 98.4 F | OXYGEN SATURATION: 96 % | SYSTOLIC BLOOD PRESSURE: 120 MMHG | RESPIRATION RATE: 20 BRPM | HEIGHT: 62 IN | WEIGHT: 275.57 LBS | BODY MASS INDEX: 50.71 KG/M2 | HEART RATE: 94 BPM | DIASTOLIC BLOOD PRESSURE: 78 MMHG

## 2025-08-17 DIAGNOSIS — R10.2 ACUTE SUPRAPUBIC PAIN: ICD-10-CM

## 2025-08-17 DIAGNOSIS — G44.201 ACUTE INTRACTABLE TENSION-TYPE HEADACHE: ICD-10-CM

## 2025-08-17 DIAGNOSIS — R11.10 ACUTE VOMITING: ICD-10-CM

## 2025-08-17 DIAGNOSIS — N39.0 ACUTE UTI: Primary | ICD-10-CM

## 2025-08-17 LAB
APPEARANCE UR: ABNORMAL
BACTERIA URNS QL MICRO: ABNORMAL /HPF
BILIRUB UR QL: NEGATIVE
COLOR UR: ABNORMAL
EPITH CASTS URNS QL MICRO: ABNORMAL /LPF
FLUAV RNA SPEC QL NAA+PROBE: NOT DETECTED
FLUBV RNA SPEC QL NAA+PROBE: NOT DETECTED
GLUCOSE UR STRIP.AUTO-MCNC: NEGATIVE MG/DL
HCG UR QL: NEGATIVE
HGB UR QL STRIP: NEGATIVE
KETONES UR QL STRIP.AUTO: ABNORMAL MG/DL
LEUKOCYTE ESTERASE UR QL STRIP.AUTO: ABNORMAL
MUCOUS THREADS URNS QL MICRO: ABNORMAL /LPF
NITRITE UR QL STRIP.AUTO: NEGATIVE
PH UR STRIP: 6.5 (ref 5–8)
PROT UR STRIP-MCNC: 30 MG/DL
RBC #/AREA URNS HPF: ABNORMAL /HPF (ref 0–5)
SARS-COV-2 RNA RESP QL NAA+PROBE: NOT DETECTED
SOURCE: NORMAL
SP GR UR REFRACTOMETRY: 1.02 (ref 1–1.03)
URINE CULTURE IF INDICATED: ABNORMAL
UROBILINOGEN UR QL STRIP.AUTO: 2 EU/DL (ref 0.2–1)
WBC URNS QL MICRO: ABNORMAL /HPF (ref 0–4)

## 2025-08-17 PROCEDURE — 81025 URINE PREGNANCY TEST: CPT

## 2025-08-17 PROCEDURE — 87636 SARSCOV2 & INF A&B AMP PRB: CPT

## 2025-08-17 PROCEDURE — 93005 ELECTROCARDIOGRAM TRACING: CPT

## 2025-08-17 PROCEDURE — 6370000000 HC RX 637 (ALT 250 FOR IP): Performed by: EMERGENCY MEDICINE

## 2025-08-17 PROCEDURE — 87147 CULTURE TYPE IMMUNOLOGIC: CPT

## 2025-08-17 PROCEDURE — 87086 URINE CULTURE/COLONY COUNT: CPT

## 2025-08-17 PROCEDURE — 81001 URINALYSIS AUTO W/SCOPE: CPT

## 2025-08-17 PROCEDURE — 99284 EMERGENCY DEPT VISIT MOD MDM: CPT

## 2025-08-17 RX ORDER — ONDANSETRON 4 MG/1
4 TABLET, ORALLY DISINTEGRATING ORAL 3 TIMES DAILY PRN
Qty: 21 TABLET | Refills: 0 | Status: SHIPPED | OUTPATIENT
Start: 2025-08-17

## 2025-08-17 RX ORDER — ONDANSETRON 4 MG/1
4 TABLET, ORALLY DISINTEGRATING ORAL ONCE
Status: COMPLETED | OUTPATIENT
Start: 2025-08-17 | End: 2025-08-17

## 2025-08-17 RX ORDER — CEPHALEXIN 500 MG/1
500 CAPSULE ORAL 3 TIMES DAILY
Qty: 21 CAPSULE | Refills: 0 | Status: SHIPPED | OUTPATIENT
Start: 2025-08-17 | End: 2025-08-24

## 2025-08-17 RX ORDER — OXYMETAZOLINE HYDROCHLORIDE 0.05 G/100ML
2 SPRAY NASAL
Status: COMPLETED | OUTPATIENT
Start: 2025-08-17 | End: 2025-08-17

## 2025-08-17 RX ORDER — BUTALBITAL, ACETAMINOPHEN AND CAFFEINE 50; 325; 40 MG/1; MG/1; MG/1
1 TABLET ORAL EVERY 4 HOURS PRN
Qty: 15 TABLET | Refills: 0 | Status: SHIPPED | OUTPATIENT
Start: 2025-08-17

## 2025-08-17 RX ORDER — BUTALBITAL, ACETAMINOPHEN AND CAFFEINE 50; 325; 40 MG/1; MG/1; MG/1
1 TABLET ORAL
Status: COMPLETED | OUTPATIENT
Start: 2025-08-17 | End: 2025-08-17

## 2025-08-17 RX ADMIN — BUTALBITAL, ACETAMINOPHEN AND CAFFEINE 1 TABLET: 325; 50; 40 TABLET ORAL at 22:00

## 2025-08-17 RX ADMIN — ONDANSETRON 4 MG: 4 TABLET, ORALLY DISINTEGRATING ORAL at 21:34

## 2025-08-17 RX ADMIN — OXYMETAZOLINE HYDROCHLORIDE 2 SPRAY: 0.5 SPRAY NASAL at 21:38

## 2025-08-17 ASSESSMENT — PAIN DESCRIPTION - LOCATION: LOCATION: HEAD;CHEST

## 2025-08-17 ASSESSMENT — PAIN DESCRIPTION - PAIN TYPE: TYPE: ACUTE PAIN

## 2025-08-17 ASSESSMENT — PAIN SCALES - GENERAL: PAINLEVEL_OUTOF10: 9

## 2025-08-17 ASSESSMENT — PAIN - FUNCTIONAL ASSESSMENT: PAIN_FUNCTIONAL_ASSESSMENT: 0-10

## 2025-08-18 LAB
EKG ATRIAL RATE: 91 BPM
EKG DIAGNOSIS: NORMAL
EKG P AXIS: 29 DEGREES
EKG P-R INTERVAL: 156 MS
EKG Q-T INTERVAL: 372 MS
EKG QRS DURATION: 84 MS
EKG QTC CALCULATION (BAZETT): 457 MS
EKG R AXIS: 27 DEGREES
EKG T AXIS: 25 DEGREES
EKG VENTRICULAR RATE: 91 BPM

## 2025-08-19 LAB
BACTERIA SPEC CULT: ABNORMAL
BACTERIA SPEC CULT: ABNORMAL
CC UR VC: ABNORMAL
SERVICE CMNT-IMP: ABNORMAL